# Patient Record
Sex: FEMALE | Race: BLACK OR AFRICAN AMERICAN | Employment: FULL TIME | URBAN - METROPOLITAN AREA
[De-identification: names, ages, dates, MRNs, and addresses within clinical notes are randomized per-mention and may not be internally consistent; named-entity substitution may affect disease eponyms.]

---

## 2020-06-25 ENCOUNTER — VIRTUAL VISIT (OUTPATIENT)
Dept: FAMILY MEDICINE CLINIC | Age: 50
End: 2020-06-25

## 2020-06-25 DIAGNOSIS — Z12.11 COLON CANCER SCREENING: ICD-10-CM

## 2020-06-25 DIAGNOSIS — Z12.39 BREAST CANCER SCREENING: ICD-10-CM

## 2020-06-25 DIAGNOSIS — Z76.89 ENCOUNTER TO ESTABLISH CARE: Primary | ICD-10-CM

## 2020-06-25 DIAGNOSIS — M25.512 ACUTE PAIN OF LEFT SHOULDER: ICD-10-CM

## 2020-06-25 RX ORDER — AMLODIPINE BESYLATE 10 MG/1
TABLET ORAL
COMMUNITY
Start: 2020-05-15 | End: 2020-11-20 | Stop reason: SDUPTHER

## 2020-06-25 RX ORDER — HYDROCHLOROTHIAZIDE 25 MG/1
TABLET ORAL
COMMUNITY
Start: 2020-05-15 | End: 2020-11-20 | Stop reason: SDUPTHER

## 2020-06-25 RX ORDER — POTASSIUM CHLORIDE 20 MEQ/1
TABLET, EXTENDED RELEASE ORAL
COMMUNITY
Start: 2020-05-26 | End: 2020-09-09 | Stop reason: SDUPTHER

## 2020-06-25 RX ORDER — METOPROLOL TARTRATE 25 MG/1
TABLET, FILM COATED ORAL
COMMUNITY
Start: 2020-05-26 | End: 2020-07-03 | Stop reason: SDUPTHER

## 2020-06-25 RX ORDER — CETIRIZINE HCL 10 MG
TABLET ORAL
COMMUNITY

## 2020-06-25 NOTE — PROGRESS NOTES
Kristin Blanco  48 y.o. female  1970  [address] [facility] [address]  [id number]   [address]:    Telemedicine Progress Note  Daria Parish MD       Encounter Date and Time: June 25, 2020 at 8:09 AM    Consent: Inder Troncoso, who was seen by synchronous (real-time) audio-video technology, and/or her healthcare decision maker, is aware that this patient-initiated, Telehealth encounter on 6/25/2020 is a billable service, with coverage as determined by her insurance carrier. She is aware that she may receive a bill and has provided verbal consent to proceed: Yes. Chief Complaint   Patient presents with    Establish Care     History of Present Illness   Inder Troncoso is a 48 y.o. female was evaluated by synchronous (real-time) audio-video technology from home, through a secure patient portal.      Patient has a history of HTN, ovarian cysts, menorrhagia s/p hysterectomy, and hypertensive retinopathy and presents to establish care and discuss shoulder pain. Reports pain in left shoulder x3 months. Describes as aching pain radiating down to the left elbow that worsens with lifting heavy objects at work (works at [employer]). Pain worse at night. Better with Oxycodone 5mg (had 3 tabs left over from hysterectomy in 2/2020). Tried Ibuprofen twice daily with relief. Aleve did not help. Occasionally hears popping sound from shoulder. OB/Gyn:  S/p hysterectomy in 2/2020  [id number]  Sexually active?: Yes  Diet: well-balanced; avoids pork  Exercise: none       Immunizations - reviewed: There is no immunization history on file for this patient. Tdap: unsure of last booster  Zostervax:  Today    Health Maintenance reviewed -  Pap smear-about one year ago; denies hx abnormal paps  Mammogram-abnormal results 6 months ago followed by benign ultrasound  Colonoscopy-Today   DEXA scan-Not indicated  HIV testing-Negative in adult life  Hepatitis C testing-Today  Lung cancer screening-Not indicated    Review of Systems   Review of Systems   Constitutional: Negative for chills and fever. HENT: Negative for congestion and sore throat. Eyes: Negative for blurred vision and double vision. Respiratory: Negative for cough and shortness of breath. Cardiovascular: Negative for chest pain and leg swelling. Gastrointestinal: Negative for nausea and vomiting. Genitourinary: Negative for dysuria and urgency. Musculoskeletal: Negative for back pain. Left shoulder pain   Skin: Negative for itching and rash. Neurological: Negative for focal weakness and headaches. Vitals/Objective:     General: alert, cooperative, no distress   Mental  status: mental status: alert, oriented to person, place, and time, normal mood, behavior, speech, dress, motor activity, and thought processes   Resp: resp: normal effort and no respiratory distress   MSK: Full ROM in shoulders and elbows bilaterally; although abduction of shoulder on left causes pain. Neuro: neuro: no gross deficits   Skin: skin: no discoloration or lesions of concern on visible areas   Due to this being a TeleHealth evaluation, many elements of the physical examination are unable to be assessed. Assessment and Plan:       Assessment/Plan:    Establish care:  -Colonoscopy ordered  -Mammogram-initially abnormal w/ benign ultrasound 6 months ago; next due in 6 months  -Zostervax ordered  -Will need RN visit for Tdap  -Hepatitis C  -CBC, BMP, lipid    Left shoulder pain: Likely OA; low suspicion for rotator cuff injury or impingement based on exam.  -Xray of left shoulder   -Ibuprofen Q4-6H PRN and Tylenol Q6H PRN    HTN: Stable.   Continue Norvasc 10mg daily, HCTZ 25mg daily, and Metoprolol 25mg BID        Time spent in direct conversation with the patient to include medical condition(s) discussed, assessment and treatment plan:       We discussed the expected course, resolution and complications of the diagnosis(es) in detail. Medication risks, benefits, costs, interactions, and alternatives were discussed as indicated. I advised her to contact the office if her condition worsens, changes or fails to improve as anticipated. She expressed understanding with the diagnosis(es) and plan. Patient understands that this encounter was a temporary measure, and the importance of further follow up and examination was emphasized. Patient verbalized understanding. Patient informed to follow up: PRN. Follow-up and Dispositions           Electronically Signed: Ivette Chacon MD, Family Medicine Resident    CPT Codes 02943-90982 for Established Patients may apply to this Telehealth Visit. POS code: 18. Modifier GT    Marilee Melgoza is a 48 y.o. female who was evaluated by an audio-video encounter for concerns as above. Patient identification was verified prior to start of the visit. A caregiver was present when appropriate. Due to this being a TeleHealth encounter (During [facility] public health emergency), evaluation of the following organ systems was limited: Vitals/Constitutional/EENT/Resp/CV/GI//MS/Neuro/Skin/Heme-Lymph-Imm. Pursuant to the emergency declaration under the [facility] waiver authority and the [employer] Act, this Virtual Visit was conducted, with patient's (and/or legal guardian's) consent, to reduce the patient's risk of exposure to COVID-19 and provide necessary medical care. Services were provided through a synchronous discussion virtually to substitute for in-person clinic visit. I was at home. The patient was at home. History   Patients past medical, surgical and family histories were reviewed and updated.       Past Medical History:   Diagnosis Date    Hypertension     Hypertensive retinopathy     Ovarian cyst      Past Surgical History:   Procedure Laterality Date    HX  SECTION      HX HYSTERECTOMY  2020    hystrectomy with unilateral oophorectomy    HX TUBAL LIGATION  1994     Family History   Problem Relation Age of Onset    Diabetes Father     Stroke Father      Social History     Socioeconomic History    Marital status: SINGLE     Spouse name: Not on file    Number of children: Not on file    Years of education: Not on file    Highest education level: Not on file   Occupational History    Not on file   Social Needs    Financial resource strain: Not on file    Food insecurity     Worry: Not on file     Inability: Not on file    Transportation needs     Medical: Not on file     Non-medical: Not on file   Tobacco Use    Smoking status: Never Smoker    Smokeless tobacco: Never Used   Substance and Sexual Activity    Alcohol use: Yes     Comment: social    Drug use: Never    Sexual activity: Not on file   Lifestyle    Physical activity     Days per week: Not on file     Minutes per session: Not on file    Stress: Not on file   Relationships    Social connections     Talks on phone: Not on file     Gets together: Not on file     Attends Jewish service: Not on file     Active member of club or organization: Not on file     Attends meetings of clubs or organizations: Not on file     Relationship status: Not on file    Intimate partner violence     Fear of current or ex partner: Not on file     Emotionally abused: Not on file     Physically abused: Not on file     Forced sexual activity: Not on file   Other Topics Concern    Not on file   Social History Narrative    Not on file     There is no problem list on file for this patient.          Current Medications/Allergies   Medications and Allergies reviewed:      Not on File

## 2020-06-30 ENCOUNTER — HOSPITAL ENCOUNTER (OUTPATIENT)
Dept: LAB | Age: 50
Discharge: HOME | End: 2020-06-30

## 2020-06-30 DIAGNOSIS — Z76.89 ENCOUNTER TO ESTABLISH CARE: ICD-10-CM

## 2020-06-30 LAB
ANION GAP SERPL CALC-SCNC: 7 MMOL/L (ref 5–15)
BUN SERPL-MCNC: 8 MG/DL (ref 6–20)
BUN/CREAT SERPL: 13 (ref 12–20)
CALCIUM SERPL-MCNC: 8.8 MG/DL (ref 8.5–10.1)
CHLORIDE SERPL-SCNC: 103 MMOL/L (ref 97–108)
CHOLEST SERPL-MCNC: 177 MG/DL
CO2 SERPL-SCNC: 25 MMOL/L (ref 21–32)
CREAT SERPL-MCNC: 0.62 MG/DL (ref 0.55–1.02)
ERYTHROCYTE [DISTWIDTH] IN BLOOD BY AUTOMATED COUNT: 14.8 % (ref 11.5–14.5)
GLUCOSE SERPL-MCNC: 83 MG/DL (ref 65–100)
HCT VFR BLD AUTO: 40.1 % (ref 35–47)
HCV AB SERPL QL IA: NONREACTIVE
HCV COMMENT,HCGAC: NORMAL
HDLC SERPL-MCNC: 40 MG/DL
HDLC SERPL: 4.4 {RATIO} (ref 0–5)
HGB BLD-MCNC: 12.9 G/DL (ref 11.5–16)
LDLC SERPL CALC-MCNC: 109.4 MG/DL (ref 0–100)
LIPID PROFILE,FLP: ABNORMAL
MCH RBC QN AUTO: 32.3 PG (ref 26–34)
MCHC RBC AUTO-ENTMCNC: 32.2 G/DL (ref 30–36.5)
MCV RBC AUTO: 100.5 FL (ref 80–99)
NRBC # BLD: 0 K/UL (ref 0–0.01)
NRBC BLD-RTO: 0 PER 100 WBC
PLATELET # BLD AUTO: 213 K/UL (ref 150–400)
PMV BLD AUTO: 13 FL (ref 8.9–12.9)
POTASSIUM SERPL-SCNC: 3.8 MMOL/L (ref 3.5–5.1)
RBC # BLD AUTO: 3.99 M/UL (ref 3.8–5.2)
SODIUM SERPL-SCNC: 135 MMOL/L (ref 136–145)
TRIGL SERPL-MCNC: 138 MG/DL (ref ?–150)
VLDLC SERPL CALC-MCNC: 27.6 MG/DL
WBC # BLD AUTO: 6.7 K/UL (ref 3.6–11)

## 2020-07-02 ENCOUNTER — TELEPHONE (OUTPATIENT)
Dept: FAMILY MEDICINE CLINIC | Age: 50
End: 2020-07-02

## 2020-07-02 NOTE — TELEPHONE ENCOUNTER
----- Message from Isaias Lee sent at 7/2/2020 11:34 AM EDT -----  Regarding: Leak/telephone  Pt is requesting her x ray results on her left shoulder. Pts number is [phone].

## 2020-07-03 ENCOUNTER — TELEPHONE (OUTPATIENT)
Dept: OBGYN CLINIC | Age: 50
End: 2020-07-03

## 2020-07-03 DIAGNOSIS — M25.512 ACUTE PAIN OF LEFT SHOULDER: Primary | ICD-10-CM

## 2020-07-03 DIAGNOSIS — D75.89 MACROCYTOSIS: ICD-10-CM

## 2020-07-03 RX ORDER — METOPROLOL TARTRATE 25 MG/1
25 TABLET, FILM COATED ORAL 2 TIMES DAILY
Qty: 60 TAB | Refills: 2 | Status: SHIPPED | OUTPATIENT
Start: 2020-07-03 | End: 2020-10-01

## 2020-07-03 NOTE — TELEPHONE ENCOUNTER
Discussed lab results with patient. Referral to Sports Med and PT placed for possible left [place] lesion. Patient has macrocytosis--checking B12, folate. Patient understands and agrees with plan.

## 2020-07-07 ENCOUNTER — OFFICE VISIT (OUTPATIENT)
Dept: FAMILY MEDICINE CLINIC | Age: 50
End: 2020-07-07

## 2020-07-07 ENCOUNTER — HOSPITAL ENCOUNTER (OUTPATIENT)
Dept: LAB | Age: 50
Discharge: HOME | End: 2020-07-07

## 2020-07-07 VITALS
WEIGHT: 195 LBS | HEIGHT: 66 IN | RESPIRATION RATE: 20 BRPM | TEMPERATURE: 98.4 F | HEART RATE: 79 BPM | BODY MASS INDEX: 31.34 KG/M2 | DIASTOLIC BLOOD PRESSURE: 77 MMHG | OXYGEN SATURATION: 97 % | SYSTOLIC BLOOD PRESSURE: 120 MMHG

## 2020-07-07 DIAGNOSIS — D75.89 MACROCYTOSIS: ICD-10-CM

## 2020-07-07 DIAGNOSIS — M75.102 ROTATOR CUFF SYNDROME OF LEFT SHOULDER: Primary | ICD-10-CM

## 2020-07-07 LAB
FOLATE SERPL-MCNC: 17.1 NG/ML (ref 5–21)
VIT B12 SERPL-MCNC: 345 PG/ML (ref 193–986)

## 2020-07-07 RX ORDER — TRIAMCINOLONE ACETONIDE 40 MG/ML
40 INJECTION, SUSPENSION INTRA-ARTICULAR; INTRAMUSCULAR ONCE
Qty: 1 ML | Refills: 0
Start: 2020-07-07 | End: 2020-07-07

## 2020-07-07 RX ORDER — LIDOCAINE HYDROCHLORIDE 10 MG/ML
3 INJECTION INFILTRATION; PERINEURAL ONCE
Qty: 3 ML | Refills: 0
Start: 2020-07-07 | End: 2020-07-07

## 2020-07-07 NOTE — PROGRESS NOTES
History of Present Illness     Chief Complaint   Patient presents with    Shoulder Pain     left shoulder, pain is 4/10 - 2-3 months - patient believes she may have hurt it at work ([address])       Patient Identification  Zulema Taylor is a 48 y.o. female complains of pain in the left shoulder pain. Date of Onset: 2-3 months   Mechanism of Injury: No MIRNA   Alleviating Factors: oxycodone  Aggravating Factors:putting on bra, reaching for thing    Imaging: I personally reviewed xray    Past Medical History:   Diagnosis Date    Hypertension     Hypertensive retinopathy     Ovarian cyst      Family History   Problem Relation Age of Onset    Diabetes Father     Stroke Father      Current Outpatient Medications   Medication Sig Dispense Refill    lidocaine (XYLOCAINE) 10 mg/mL (1 %) injection 3 mL by IntraBURSal route once for 1 dose. 3 mL 0    triamcinolone acetonide (Kenalog) 40 mg/mL injection 1 mL by IntraBURSal route once for 1 dose. 1 mL 0    metoprolol tartrate (LOPRESSOR) 25 mg tablet Take 1 Tab by mouth two (2) times a day for 90 days. 60 Tab 2    potassium chloride (K-DUR, KLOR-CON) 20 mEq tablet TAKE 1 TABLET BY MOUTH ONCE DAILY      amLODIPine (NORVASC) 10 mg tablet TAKE 1 TABLET BY MOUTH ONCE DAILY      cetirizine (ZyrTEC) 10 mg tablet Zyrtec 10 mg tablet   Take 1 tablet every day by oral route at bedtime for 90 days.  hydroCHLOROthiazide (HYDRODIURIL) 25 mg tablet TAKE 1 TABLET BY MOUTH ONCE DAILY       No Known Allergies    Review of Systems  A comprehensive review of systems was negative except for that written in the HPI. Physical Exam     Visit Vitals  /77 (BP 1 Location: Right arm, BP Patient Position: Sitting)   Pulse 79   Temp 98.4 °F (36.9 °C) (Temporal)   Resp 20   Ht 5' 6\" (1.676 m)   Wt 195 lb (88.5 kg)   SpO2 97%   BMI 31.47 kg/m²       GEN: Well appearing. No apparent distress. Responds to all questions appropriately. Lungs: No labored respirations.  Talking in  complete sentences without difficulty. Shoulder: Right    Deformity: None    ROM:  Forward Flexion: Active: 180 Passive: 180  ER at 90 degrees abduction: Active: 90 Passive:90  IR at 90 degrees abduction: Active: 90 Passive:90  Abduction: Active: 180 Passive: 180    Scapular Motion: Mild dyskinesia noted    Palpation:  AC tenderness: None  SC tenderness: None  Clavicle tenderness: None  Biceps tenderness: None    Strength:  Empty Can(Supraspinatus): Left:5/5 Right:5/5  External rotation(Infraspinatus): Left:5/5 Right: 5/5  Lift off(Subscapularis): Left:5/5 Right: 5/5    Rotator Cuff Exam:  Neers sign: Positive  [place] sign: Positive  Painful Arc: Negative  Lift-off sign / Belly Press: Negative  Biceps/Labrum/AC Exam:  Yergasons Test: Negative  Speeds Test: Negative  OLashas Sign: Negative  Cross-chest adduction: Negative  Scarf Test: Negative    Neuro/Vascular:  Pulses intact, no edema, and neurologically intact  Skin: No obvious rash or skin breakdown       [facility]  OFFICE PROCEDURE PROGRESS NOTE        Chart reviewed for the following:   Claudio Read MD, have reviewed the History, Physical and updated the Allergic reactions for Kristin Bella     TIME OUT performed immediately prior to start of procedure:   I, Terence Joseph MD, have performed the following reviews on Kristin Bella prior to the start of the procedure:            * Patient was identified by name and date of birth   * Agreement on procedure being performed was verified  * Risks and Benefits explained to the patient  * Procedure site verified and marked as necessary  * Patient was positioned for comfort  * Consent was signed and verified     Time: 3:45pm      Date of procedure: 7/7/2020    Procedure performed by:  Terence Joseph MD    Provider assisted by:  Farhad Gale LPN    Patient assisted by: self    How tolerated by patient: tolerated the procedure well with no complications    Post Procedural Pain Scale: 0 - No Hurt      Indications:   Diagnostic    Procedure:  After verbal consent was obtained, risks and benefits of the procedure were discussed with the patient and alternatives discussed. Time out performed, cross checking patient ID and procedure. Confirmed that the patient does not have history of prior adverse reactions, active infections, or relevant allergies. There was no effusion, erythema, or warmth, and the skin was clear. The skin was cleaned using chlorohexadine x 2. Topical anesthesia was achieved with ethyl chloride. A 25 gauge needle was inserted into the right subacromial space via a lateral approach. The site was injected with a mixture of 40mg of Kenalog and 3ml 1% Lidocaine. The injection was completed without complication, and a bandage was applied. Patient felt 40% better after injection. No results found for any visits on 07/07/20. Assessment:    ICD-10-CM ICD-9-CM    1. Rotator cuff syndrome of left shoulder M75.102 726.10 TRIAMCINOLONE ACETONIDE INJ      lidocaine (XYLOCAINE) 10 mg/mL (1 %) injection      MA DRAIN/INJECT LARGE JOINT/BURSA      triamcinolone acetonide (Kenalog) 40 mg/mL injection      REFERRAL TO PHYSICAL THERAPY       Plan:  -Improved with injection.  -Home Exercise Program: As discussed in clinic and per handout.  -Start Physical Therapy   -Reassess in 6 weeks. Consider intra-articular etiology of her pain if pain is persistent at follow-up    After Care Instructions: The patient is asked to continue to rest the joint for a few more days before resuming regular activities. It may be more painful for the first 1-2 days. Watch for fever, or increased swelling or persistent pain in the joint. Call or return to clinic prn if such symptoms occur or there is failure to improve as anticipated. Follow-up and Dispositions    · Return in about 6 weeks (around 8/18/2020) for Shoulder pain.

## 2020-07-07 NOTE — PATIENT INSTRUCTIONS
Rotator Cuff Problems: Care Instructions Your Care Instructions The rotator cuff is a group of tendons and muscles around the shoulder that keeps the shoulder joint stable and allows you to raise and rotate your arm. Over time, daily wear and exercise can cause the tendons to rub on the bones of your shoulder. This is called impingement. This condition may cause the tendons to bruise, degenerate, or tear. In many people, these problems do not cause pain. When they do cause pain, you can use rest, physical therapy, ice and heat, and anti-inflammatory medicine to reduce pain and swelling. If you still have pain after trying these treatments, you and your doctor can discuss having a steroid injection or surgery. Follow-up care is a key part of your treatment and safety. Be sure to make and go to all appointments, and call your doctor if you are having problems. It's also a good idea to know your test results and keep a list of the medicines you take. How can you care for yourself at home? · Be safe with medicines. Read and follow all instructions on the label. ? If the doctor gave you a prescription medicine for pain, take it as prescribed. ? If you are not taking a prescription pain medicine, ask your doctor if you can take an over-the-counter medicine. · Put ice or a cold pack on your shoulder for 10 to 20 minutes at a time. Try to do this every 1 to 2 hours for the next 3 days (when you are awake). Put a thin cloth between the ice pack and your skin. · After 3 days, put a warm, wet towel on your shoulder. This is to relax the muscles and increase blood flow. While holding the towel on your shoulder, lean forward so your arm hangs freely, and gently swing your arm back and forth like a pendulum. You also can do this standing under a warm shower. · Follow your doctor's advice for physical therapy. When your doctor says it is okay, try these stretching exercises. Do them slowly to avoid injury. Put a warm, wet towel on your shoulder before exercising. Stop any exercise that increases pain. ? Range-of-motion exercises. If it is not too painful, stretch your arm in four directions: across the body, up the back, to the side, and overhead. ? Pendulum exercise. Lean forward and hold onto a table or the back of a chair with your good arm. Bend at the waist, letting the arm with the sore shoulder hang straight down. Swing your arm back and forth like a pendulum, then in circles that start small and slowly grow larger. This exercise does not use the arm muscles. Instead, use your legs and your hips to create movement that makes your arm swing freely. Try this for about 5 minutes, several times a day. ? Wall climbing (to the side). Stand with your side to a wall so that your fingers can just touch it. Then turn so your body is turned slightly toward the wall. Walk the fingers of your injured arm up the wall as high as pain permits. Try not to shrug your shoulder up toward your ear as you move your arm up. Hold that position for a count of 15 to 30 seconds. Walk your fingers down to the starting position. Repeat 2 to 4 times, trying to reach higher each time. ? Wall climbing (to the front). Face a wall, standing so your fingers can just touch it. Walk the fingers of your affected arm up the wall as high as pain permits. Try not to shrug your shoulder up toward your ear as you move your arm up. Hold that position for a count of 15 to 30 seconds. Slowly walk your fingers to the starting position. Repeat 2 to 4 times, trying to reach higher each time. · Rest your shoulder when you are not doing stretches and other exercises. Your doctor may tell you to wait for the pain to go away before doing exercises. Do not lift heavy bags of groceries, play sports, or do anything else that makes you twist or stress your shoulder. Avoid activities where you move your affected arm above your head. When should you call for help? Call your doctor now or seek immediate medical care if: 
· You have severe pain. · You cannot move your shoulder or arm. · You have tingling or numbness in your arm or hand. · Your arm or hand is cool or pale. Watch closely for changes in your health, and be sure to contact your doctor if: 
· Your pain gets worse. · You have new or worse swelling in your arm or hand. · You do not get better as expected. Where can you learn more? Go to http://www.gray.com/ Enter E207 in the search box to learn more about \"Rotator Cuff Problems: Care Instructions. \" Current as of: March 2, 2020               Content Version: 12.5 © 8870-4918 [employer]. Care instructions adapted under license by [employer] (which disclaims liability or warranty for this information). If you have questions about a medical condition or this instruction, always ask your healthcare professional. [address] any warranty or liability for your use of this information.

## 2020-07-07 NOTE — PROGRESS NOTES
Identified Patient with two Patient identifiers (Name and ). Two Patient Identifiers confirmed. Reviewed record in preparation for visit and have obtained necessary documentation. Chief Complaint   Patient presents with    Shoulder Pain     left shoulder, pain is 4/10 - 2-3 months - patient believes she may have hurt it at work ([employer])       Visit Vitals  /77 (BP 1 Location: Right arm, BP Patient Position: Sitting)   Pulse 79   Temp 98.4 °F (36.9 °C) (Temporal)   Resp 20   Ht 5' 6\" (1.676 m)   Wt 195 lb (88.5 kg)   SpO2 97%   BMI 31.47 kg/m²       1. Have you been to the ER, urgent care clinic since your last visit? Hospitalized since your last visit? No    2. Have you seen or consulted any other health care providers outside of the [address] [place] [address] since your last visit? Include any pap smears or colon screening.  No

## 2020-09-09 NOTE — TELEPHONE ENCOUNTER
----- Message from Herrick Dinesh sent at 9/8/2020  4:46 PM EDT -----  Regarding: DR Lorena Freitas /  REFILL  General Message/Vendor Calls    Pt is requesting to speak with nurse in regard to getting a refill on Potassium,  please call into the [facility] IV[facility] LOCO listed in chart.        Callback required       Best contact number(s):  [phone]                [employer]

## 2020-09-10 RX ORDER — POTASSIUM CHLORIDE 20 MEQ/1
TABLET, EXTENDED RELEASE ORAL
Qty: 60 TAB | Refills: 1 | Status: SHIPPED | OUTPATIENT
Start: 2020-09-10

## 2020-11-19 ENCOUNTER — VIRTUAL VISIT (OUTPATIENT)
Dept: FAMILY MEDICINE CLINIC | Age: 50
End: 2020-11-19

## 2020-11-19 DIAGNOSIS — R42 POSTURAL DIZZINESS WITH PRESYNCOPE: Primary | ICD-10-CM

## 2020-11-19 DIAGNOSIS — R55 POSTURAL DIZZINESS WITH PRESYNCOPE: Primary | ICD-10-CM

## 2020-11-19 RX ORDER — METOPROLOL TARTRATE 25 MG/1
25 TABLET, FILM COATED ORAL 2 TIMES DAILY
COMMUNITY
End: 2021-07-09

## 2020-11-19 NOTE — PROGRESS NOTES
Kristin Blanco  48 y.o. female  1970  [address]  [id number]   [address]:    Telemedicine Progress Note  Alina Urias MD       Encounter Date and Time: November 19, 2020 at 11:03 AM    Consent:  She and/or the health care decision maker is aware that that she may receive a bill for this telephone service, depending on her insurance coverage, and has provided verbal consent to proceed: Yes    Chief Complaint   Patient presents with    Medication Refill    Labs     History of Present Illness   Kulwant Beatty is a 48 y.o. female was evaluated by synchronous (real-time) audio-video technology from home, through the [facility].    PreSyncope  Believes it may be anxiety. She's okay one minute then feel like she's about to pass out. Sometimes feels like her heart rate increases beforehand. Thinks it may be a panic attack or associated with low blood sugar. Has not noticed whether its associated with not eating. Last time it happened she took a couple of deep breaths and it stopped. This has been going on since 2010. In the last 3-6 months has not passed out. Hysterectomy 2/2020 for heavy menses now without any menstrual. No history of anxiety nor depression. Has never been in therapy. She does take her blood pressure at home when she has a HA or feels like her heart is racing - runs 110-120s/80-90. Has never seen cardiology. No family history of sudden death at young age. Father had a stroke in his early 76s but no other family history of MI. Father also had DM. Takes Norvasc, HCTZ and metoprolol for HTN however out of metoprolol x 2 days. Review of Systems   Review of Systems   Constitutional: Negative for chills and fever. Eyes: Negative for blurred vision and double vision. Respiratory: Negative for cough and shortness of breath. Cardiovascular: Negative for chest pain and palpitations.    Musculoskeletal: Negative for falls and myalgias. Neurological: Positive for dizziness. Negative for tingling, tremors, sensory change and headaches. Psychiatric/Behavioral: The patient is not nervous/anxious and does not have insomnia. Vitals/Objective:     General: alert, cooperative, no distress, moderately obese   Mental  status: mental status: alert, oriented to person, place, and time, normal mood, behavior, speech, dress, motor activity, and thought processes, affect appropriate to mood   Resp: resp: normal effort and no respiratory distress   Neuro: neuro: no gross deficits   Skin: skin: no discoloration or lesions of concern on visible areas   Due to this being a TeleHealth evaluation, many elements of the physical examination are unable to be assessed. Assessment and Plan:     54yo F with hx of HTN, Obesity presenting for intermittent episodes of lightheadedness. She had syncopal events prior to her hysterectomy for AUB 2/2020 which were likely due to anemia. HgB on CBC 6/2020 WNL. Other than HTN and Obesity she has no other cardiac hx however since these episodes seem unrelated to food intake and posture want to see in person for thorough CV exam, EKG and orthostatic VS; low threshold to refer to cardiology and will repeat labs. Advised patient to refrain from taking metoprolol for now until she is seen in person as BB may be contributing to low HR. Appt made for 11/20/2020 @ 2:15pm     1. Postural dizziness with presyncope  - CBC W/O DIFF; Future  - METABOLIC PANEL, COMPREHENSIVE; Future  - HEMOGLOBIN A1C WITH EAG; Future      Time spent in direct conversation with the patient to include medical condition(s) discussed, assessment and treatment plan:       We discussed the expected course, resolution and complications of the diagnosis(es) in detail. Medication risks, benefits, costs, interactions, and alternatives were discussed as indicated.   I advised her to contact the office if her condition worsens, changes or fails to improve as anticipated. She expressed understanding with the diagnosis(es) and plan. Patient understands that this encounter was a temporary measure, and the importance of further follow up and examination was emphasized. Patient verbalized understanding. Patient informed to follow up: Follow-up and Dispositions  ·   Return in about 1 year (around 2021) for in offices visit for further evaluation. Electronically Signed: Ezra Vu MD    Providers location when delivering service: home    CPT Codes 91864-59001 for Established Patients may apply to this Telehealth Visit. POS code: 18. Modifier GT      Pursuant to the emergency declaration under the [address] [place] [address] waiver authority and the [employer] Act, this Virtual  Visit was conducted, with patient's consent, to reduce the patient's risk of exposure to COVID-19 and provide continuity of care for an established patient. Services were provided through a video synchronous discussion virtually to substitute for in-person clinic visit. History   Patients past medical, surgical and family histories were reviewed and updated.       Past Medical History:   Diagnosis Date    Hypertension     Hypertensive retinopathy     Ovarian cyst      Past Surgical History:   Procedure Laterality Date    HX  SECTION      HX HYSTERECTOMY      hystrectomy with unilateral oophorectomy    HX TUBAL LIGATION       Family History   Problem Relation Age of Onset    Diabetes Father     Stroke Father      Social History     Socioeconomic History    Marital status: SINGLE     Spouse name: Not on file    Number of children: Not on file    Years of education: Not on file    Highest education level: Not on file   Occupational History    Not on file   Social Needs    Financial resource strain: Not on file    Food insecurity     Worry: Not on file Inability: Not on file    Transportation needs     Medical: Not on file     Non-medical: Not on file   Tobacco Use    Smoking status: Never Smoker    Smokeless tobacco: Never Used   Substance and Sexual Activity    Alcohol use: Yes     Comment: social    Drug use: Never    Sexual activity: Not on file   Lifestyle    Physical activity     Days per week: Not on file     Minutes per session: Not on file    Stress: Not on file   Relationships    Social connections     Talks on phone: Not on file     Gets together: Not on file     Attends Mormonism service: Not on file     Active member of club or organization: Not on file     Attends meetings of clubs or organizations: Not on file     Relationship status: Not on file    Intimate partner violence     Fear of current or ex partner: Not on file     Emotionally abused: Not on file     Physically abused: Not on file     Forced sexual activity: Not on file   Other Topics Concern    Not on file   Social History Narrative    Not on file     There is no problem list on file for this patient. Current Medications/Allergies   Medications and Allergies reviewed:    Current Outpatient Medications   Medication Sig Dispense Refill    metoprolol tartrate (LOPRESSOR) 25 mg tablet Take 25 mg by mouth two (2) times a day.  potassium chloride (K-DUR, KLOR-CON) 20 mEq tablet TAKE 1 TABLET BY MOUTH ONCE DAILY 60 Tab 1    amLODIPine (NORVASC) 10 mg tablet TAKE 1 TABLET BY MOUTH ONCE DAILY      cetirizine (ZyrTEC) 10 mg tablet Zyrtec 10 mg tablet   Take 1 tablet every day by oral route at bedtime for 90 days.       hydroCHLOROthiazide (HYDRODIURIL) 25 mg tablet TAKE 1 TABLET BY MOUTH ONCE DAILY       No Known Allergies

## 2020-11-20 ENCOUNTER — OFFICE VISIT (OUTPATIENT)
Dept: FAMILY MEDICINE CLINIC | Age: 50
End: 2020-11-20
Payer: COMMERCIAL

## 2020-11-20 VITALS
BODY MASS INDEX: 32.02 KG/M2 | DIASTOLIC BLOOD PRESSURE: 79 MMHG | HEART RATE: 85 BPM | OXYGEN SATURATION: 97 % | RESPIRATION RATE: 16 BRPM | TEMPERATURE: 97.5 F | SYSTOLIC BLOOD PRESSURE: 121 MMHG | WEIGHT: 199.2 LBS | HEIGHT: 66 IN

## 2020-11-20 DIAGNOSIS — I10 ESSENTIAL HYPERTENSION: ICD-10-CM

## 2020-11-20 DIAGNOSIS — R42 POSTURAL DIZZINESS WITH PRESYNCOPE: Primary | ICD-10-CM

## 2020-11-20 DIAGNOSIS — R55 POSTURAL DIZZINESS WITH PRESYNCOPE: Primary | ICD-10-CM

## 2020-11-20 PROCEDURE — 99214 OFFICE O/P EST MOD 30 MIN: CPT | Performed by: STUDENT IN AN ORGANIZED HEALTH CARE EDUCATION/TRAINING PROGRAM

## 2020-11-20 RX ORDER — AMLODIPINE BESYLATE 10 MG/1
TABLET ORAL
Qty: 90 TAB | Refills: 1 | Status: SHIPPED | OUTPATIENT
Start: 2020-11-20 | End: 2021-07-09 | Stop reason: SDUPTHER

## 2020-11-20 RX ORDER — HYDROCHLOROTHIAZIDE 25 MG/1
25 TABLET ORAL DAILY
Qty: 90 TAB | Refills: 1 | Status: SHIPPED | OUTPATIENT
Start: 2020-11-20 | End: 2021-07-09 | Stop reason: SDUPTHER

## 2020-11-20 NOTE — PATIENT INSTRUCTIONS
Dizziness: Care Instructions Your Care Instructions Dizziness is the feeling of unsteadiness or fuzziness in your head. It is different than having vertigo, which is a feeling that the room is spinning or that you are moving or falling. It is also different from lightheadedness, which is the feeling that you are about to faint. It can be hard to know what causes dizziness. Some people feel dizzy when they have migraine headaches. Sometimes bouts of flu can make you feel dizzy. Some medical conditions, such as heart problems or high blood pressure, can make you feel dizzy. Many medicines can cause dizziness, including medicines for high blood pressure, pain, or anxiety. If a medicine causes your symptoms, your doctor may recommend that you stop or change the medicine. If it is a problem with your heart, you may need medicine to help your heart work better. If there is no clear reason for your symptoms, your doctor may suggest watching and waiting for a while to see if the dizziness goes away on its own. Follow-up care is a key part of your treatment and safety. Be sure to make and go to all appointments, and call your doctor if you are having problems. It's also a good idea to know your test results and keep a list of the medicines you take. How can you care for yourself at home? · If your doctor recommends or prescribes medicine, take it exactly as directed. Call your doctor if you think you are having a problem with your medicine. · Do not drive while you feel dizzy. · Try to prevent falls. Steps you can take include: ? Using nonskid mats, adding grab bars near the tub, and using night-lights. ? Clearing your home so that walkways are free of anything you might trip on. 
? Letting family and friends know that you have been feeling dizzy. This will help them know how to help you. When should you call for help? Call 911 anytime you think you may need emergency care. For example, call if:   · You passed out (lost consciousness).  
  · You have dizziness along with symptoms of a heart attack. These may include: 
? Chest pain or pressure, or a strange feeling in the chest. 
? Sweating. ? Shortness of breath. ? Nausea or vomiting. ? Pain, pressure, or a strange feeling in the back, neck, jaw, or upper belly or in one or both shoulders or arms. ? Lightheadedness or sudden weakness. ? A fast or irregular heartbeat.  
  · You have symptoms of a stroke. These may include: 
? Sudden numbness, tingling, weakness, or loss of movement in your face, arm, or leg, especially on only one side of your body. ? Sudden vision changes. ? Sudden trouble speaking. ? Sudden confusion or trouble understanding simple statements. ? Sudden problems with walking or balance. ? A sudden, severe headache that is different from past headaches. Call your doctor now or seek immediate medical care if: 
  · You feel dizzy and have a fever, headache, or ringing in your ears.  
  · You have new or increased nausea and vomiting.  
  · Your dizziness does not go away or comes back. Watch closely for changes in your health, and be sure to contact your doctor if: 
  · You do not get better as expected. Where can you learn more? Go to http://www.gray.com/ Enter E407 in the search box to learn more about \"Dizziness: Care Instructions. \" Current as of: June 26, 2019               Content Version: 12.6 © 1543-1373 [employer]. Care instructions adapted under license by [employer] (which disclaims liability or warranty for this information). If you have questions about a medical condition or this instruction, always ask your healthcare professional. [place] [address] any warranty or liability for your use of this information.

## 2020-11-20 NOTE — PROGRESS NOTES
David Palmer is a 48 y.o. female    Chief Complaint   Patient presents with    Cardiac Testing     Patient is cming in for blood work. She states doctr wants ECG and orthastatic blood pressures. No other concerns. 1. Have you been to the ER, urgent care clinic since your last visit? Hospitalized since your last visit? No  M  2. Have you seen or consulted any other health care providers outside of the [address] [place] [address] since your last visit? Include any pap smears or colon screening. No      Visit Vitals  /79 (BP 1 Location: Left arm, BP Patient Position: Standing)   Pulse 85   Temp 97.5 °F (36.4 °C) (Temporal)   Resp 16   Ht 5' 6\" (1.676 m)   Wt 199 lb 3.2 oz (90.4 kg)   SpO2 97%   BMI 32.15 kg/m²           Health Maintenance Due   Topic Date Due    DTaP/Tdap/Td series (1 - Tdap) 06/25/1991    PAP AKA CERVICAL CYTOLOGY  06/25/1991    Shingrix Vaccine Age 50> (1 of 2) 06/25/2020    Colorectal Cancer Screening Combo  06/25/2020    Breast Cancer Screen Mammogram  06/25/2020    Flu Vaccine (1) 09/01/2020         Medication Reconciliation completed, changes noted.   Please  Update medication list.

## 2020-11-20 NOTE — PROGRESS NOTES
[address] Medicine Residency Attending Addendum:  Dr. Jennie Bennett MD,  the patient and I were not physically present during this encounter. The resident and I are concurrently monitoring the patient care through appropriate telecommunication technology. I discussed the findings, assessment and plan with the resident and agree with the resident's findings and plan as documented in the resident's note.       Suleiman Cr MD

## 2020-11-21 LAB
ALBUMIN SERPL-MCNC: 3.9 G/DL (ref 3.5–5)
ALBUMIN/GLOB SERPL: 1.1 {RATIO} (ref 1.1–2.2)
ALP SERPL-CCNC: 56 U/L (ref 45–117)
ALT SERPL-CCNC: 21 U/L (ref 12–78)
ANION GAP SERPL CALC-SCNC: 8 MMOL/L (ref 5–15)
AST SERPL-CCNC: 19 U/L (ref 15–37)
BILIRUB SERPL-MCNC: 0.4 MG/DL (ref 0.2–1)
BUN SERPL-MCNC: 7 MG/DL (ref 6–20)
BUN/CREAT SERPL: 11 (ref 12–20)
CALCIUM SERPL-MCNC: 9.3 MG/DL (ref 8.5–10.1)
CHLORIDE SERPL-SCNC: 103 MMOL/L (ref 97–108)
CO2 SERPL-SCNC: 26 MMOL/L (ref 21–32)
CREAT SERPL-MCNC: 0.65 MG/DL (ref 0.55–1.02)
ERYTHROCYTE [DISTWIDTH] IN BLOOD BY AUTOMATED COUNT: 14.1 % (ref 11.5–14.5)
EST. AVERAGE GLUCOSE BLD GHB EST-MCNC: 103 MG/DL
GLOBULIN SER CALC-MCNC: 3.6 G/DL (ref 2–4)
GLUCOSE SERPL-MCNC: 92 MG/DL (ref 65–100)
HBA1C MFR BLD: 5.2 % (ref 4–5.6)
HCT VFR BLD AUTO: 40.2 % (ref 35–47)
HGB BLD-MCNC: 12.9 G/DL (ref 11.5–16)
MCH RBC QN AUTO: 33.3 PG (ref 26–34)
MCHC RBC AUTO-ENTMCNC: 32.1 G/DL (ref 30–36.5)
MCV RBC AUTO: 103.9 FL (ref 80–99)
NRBC # BLD: 0 K/UL (ref 0–0.01)
NRBC BLD-RTO: 0 PER 100 WBC
PLATELET # BLD AUTO: 215 K/UL (ref 150–400)
PMV BLD AUTO: 12 FL (ref 8.9–12.9)
POTASSIUM SERPL-SCNC: 3.7 MMOL/L (ref 3.5–5.1)
PROT SERPL-MCNC: 7.5 G/DL (ref 6.4–8.2)
RBC # BLD AUTO: 3.87 M/UL (ref 3.8–5.2)
SODIUM SERPL-SCNC: 137 MMOL/L (ref 136–145)
TSH SERPL DL<=0.05 MIU/L-ACNC: 1.74 UIU/ML (ref 0.36–3.74)
WBC # BLD AUTO: 6.8 K/UL (ref 3.6–11)

## 2021-02-08 ENCOUNTER — HOSPITAL ENCOUNTER (EMERGENCY)
Age: 51
Discharge: HOME | End: 2021-02-08
Attending: EMERGENCY MEDICINE
Payer: COMMERCIAL

## 2021-02-08 VITALS
DIASTOLIC BLOOD PRESSURE: 89 MMHG | SYSTOLIC BLOOD PRESSURE: 130 MMHG | TEMPERATURE: 97.9 F | RESPIRATION RATE: 18 BRPM | HEART RATE: 70 BPM | OXYGEN SATURATION: 100 %

## 2021-02-08 DIAGNOSIS — M25.50 ARTHRALGIA, UNSPECIFIED JOINT: Primary | ICD-10-CM

## 2021-02-08 PROCEDURE — 99282 EMERGENCY DEPT VISIT SF MDM: CPT

## 2021-02-08 RX ORDER — KETOROLAC TROMETHAMINE 10 MG/1
10 TABLET, FILM COATED ORAL
Qty: 18 TAB | Refills: 0 | Status: SHIPPED | OUTPATIENT
Start: 2021-02-08 | End: 2021-02-15 | Stop reason: ALTCHOICE

## 2021-02-08 NOTE — ED TRIAGE NOTES
She says bilateral shoulder pain for some time worse with movement. She says she had an injection of her left shoulder but that has worn off. She works for [employer] and says she just cant stand the pain any longer.

## 2021-02-08 NOTE — DISCHARGE INSTRUCTIONS
You will need to take the medications as directed and follow up with Dr. Tatiana Zamora in the next week for more definitive care.

## 2021-02-08 NOTE — ED PROVIDER NOTES
This is a 63-year-old female who says that she started about 2 weeks ago with pain in her right shoulder and pain in the left thumb. 1 week prior to that, she developed pain in the left shoulder. She had had prior injection of the left shoulder by Dr. Marialuisa Caal at [facility]. She is scheduled to be seen again on the  of this month for the same problem by him. She works at [employer] and states that using her arms as she does, they have become inflamed and irritated. She is looking for something for discomfort until she gets into see Dr. Marialuisa Caal.  There is been no fever or chill, nausea or vomiting, shortness of breath or chest pain. Patient denies any diarrhea or  symptoms. She voices no other complaint.            Past Medical History:   Diagnosis Date    Hypertension     Hypertensive retinopathy     Ovarian cyst        Past Surgical History:   Procedure Laterality Date    HX  SECTION      HX HYSTERECTOMY  2020    hystrectomy with unilateral oophorectomy    HX TUBAL LIGATION           Family History:   Problem Relation Age of Onset    Diabetes Father     Stroke Father        Social History     Socioeconomic History    Marital status: SINGLE     Spouse name: Not on file    Number of children: Not on file    Years of education: Not on file    Highest education level: Not on file   Occupational History    Not on file   Social Needs    Financial resource strain: Not on file    Food insecurity     Worry: Not on file     Inability: Not on file    Transportation needs     Medical: Not on file     Non-medical: Not on file   Tobacco Use    Smoking status: Never Smoker    Smokeless tobacco: Never Used   Substance and Sexual Activity    Alcohol use: Yes     Comment: social    Drug use: Never    Sexual activity: Not on file   Lifestyle    Physical activity     Days per week: Not on file     Minutes per session: Not on file    Stress: Not on file   Relationships    Social connections Talks on phone: Not on file     Gets together: Not on file     Attends Muslim service: Not on file     Active member of club or organization: Not on file     Attends meetings of clubs or organizations: Not on file     Relationship status: Not on file    Intimate partner violence     Fear of current or ex partner: Not on file     Emotionally abused: Not on file     Physically abused: Not on file     Forced sexual activity: Not on file   Other Topics Concern    Not on file   Social History Narrative    Not on file         ALLERGIES: Patient has no known allergies. Review of Systems   Constitutional: Negative for activity change, appetite change and fatigue. HENT: Negative for ear pain, facial swelling, sore throat and trouble swallowing. Eyes: Negative for pain, discharge and visual disturbance. Respiratory: Negative for chest tightness, shortness of breath and wheezing. Cardiovascular: Negative for chest pain and palpitations. Gastrointestinal: Negative for abdominal pain, blood in stool, nausea and vomiting. Genitourinary: Negative for difficulty urinating, flank pain and hematuria. Musculoskeletal: Negative for arthralgias, joint swelling, myalgias and neck pain. Bilateral shoulder and left thumb pain   Skin: Negative for color change and rash. Neurological: Negative for dizziness, weakness, numbness and headaches. Hematological: Negative for adenopathy. Does not bruise/bleed easily. Psychiatric/Behavioral: Negative for behavioral problems, confusion and sleep disturbance. All other systems reviewed and are negative. Vitals:    02/08/21 1702   BP: 130/89   Pulse: 70   Resp: 18   Temp: 97.9 °F (36.6 °C)   SpO2: 100%            Physical Exam  Vitals signs and nursing note reviewed. Constitutional:       General: She is not in acute distress. Appearance: She is well-developed.       Comments: Patient appears in a moderate degree of distress with discomfort in her shoulders and left thumb. Signs are as noted. HENT:      Head: Normocephalic and atraumatic. Nose: Nose normal.   Eyes:      General: No scleral icterus. Conjunctiva/sclera: Conjunctivae normal.      Pupils: Pupils are equal, round, and reactive to light. Neck:      Musculoskeletal: Normal range of motion and neck supple. Thyroid: No thyromegaly. Vascular: No JVD. Trachea: No tracheal deviation. Comments: No carotid bruits noted. Cardiovascular:      Rate and Rhythm: Normal rate and regular rhythm. Heart sounds: Normal heart sounds. No murmur. No friction rub. No gallop. Pulmonary:      Effort: Pulmonary effort is normal. No respiratory distress. Breath sounds: Normal breath sounds. No wheezing or rales. Chest:      Chest wall: No tenderness. Abdominal:      General: Bowel sounds are normal. There is no distension. Palpations: Abdomen is soft. There is no mass. Tenderness: There is no abdominal tenderness. There is no guarding or rebound. Musculoskeletal: Normal range of motion. General: No tenderness. Comments: There is no significant tenderness to palpation of either shoulder. He seemed a little full. Movement produces some discomfort. The thumb on the left hand does not appear swollen. There is some localized mild discomfort over the first metacarpal.   Lymphadenopathy:      Cervical: No cervical adenopathy. Skin:     General: Skin is warm and dry. Findings: No erythema or rash. Neurological:      Mental Status: She is alert and oriented to person, place, and time. Cranial Nerves: No cranial nerve deficit. Coordination: Coordination normal.      Deep Tendon Reflexes: Reflexes are normal and symmetric. Psychiatric:         Behavior: Behavior normal.         Thought Content:  Thought content normal.         Judgment: Judgment normal.          MDM  Number of Diagnoses or Management Options     Amount and/or Complexity of Data Reviewed  Decide to obtain previous medical records or to obtain history from someone other than the patient: yes  Review and summarize past medical records: yes    Risk of Complications, Morbidity, and/or Mortality  Presenting problems: moderate  Diagnostic procedures: minimal  Management options: moderate    Patient Progress  Patient progress: stable         Procedures    We will discharge the patient on Toradol and to follow-up with Dr. Keira Desir as soon as possible for possible additional treatments. She has a wrist splint thumb restraint on the left hand and wrist.  This seems to help her and she is advised to continue that splint. She states she will follow up as directed.

## 2021-02-15 ENCOUNTER — OFFICE VISIT (OUTPATIENT)
Dept: FAMILY MEDICINE CLINIC | Age: 51
End: 2021-02-15

## 2021-02-15 VITALS
SYSTOLIC BLOOD PRESSURE: 146 MMHG | TEMPERATURE: 97.8 F | HEIGHT: 66 IN | BODY MASS INDEX: 32.3 KG/M2 | DIASTOLIC BLOOD PRESSURE: 94 MMHG | WEIGHT: 201 LBS | HEART RATE: 85 BPM | RESPIRATION RATE: 16 BRPM | OXYGEN SATURATION: 100 %

## 2021-02-15 DIAGNOSIS — M25.511 RIGHT SHOULDER PAIN, UNSPECIFIED CHRONICITY: Primary | ICD-10-CM

## 2021-02-15 DIAGNOSIS — M67.911 BILATERAL ROTATOR CUFF DYSFUNCTION: ICD-10-CM

## 2021-02-15 DIAGNOSIS — M67.912 BILATERAL ROTATOR CUFF DYSFUNCTION: ICD-10-CM

## 2021-02-15 PROCEDURE — 99214 OFFICE O/P EST MOD 30 MIN: CPT | Performed by: FAMILY MEDICINE

## 2021-02-15 RX ORDER — NAPROXEN 375 MG/1
375 TABLET ORAL 2 TIMES DAILY WITH MEALS
Qty: 30 TAB | Refills: 0 | Status: SHIPPED | OUTPATIENT
Start: 2021-02-15 | End: 2021-07-09

## 2021-02-15 NOTE — PROGRESS NOTES
Jade Honeycutt is a 48 y.o. female who presents today for shoulder pain     Both shoulders hurting R>L now. R shoulder started 2-3 weeks ago. Was seen in ED on Tuesday following injury at work where boxes fell on her? Unsure of sizes of boxes. Pain was there before but that made it worse. Currently on Toradol given by ED which helps with the pain. Was seen by sports medicine on 7/7/20 and had subacromial steroid injection which she notes helped with pain for awhile but it has come back. Pt reports she has difficulty lifting above her heads at time. Never went to physical therapy last time     Data reviewed or ordered today:       Other problems include: There is no problem list on file for this patient. Medications:  Current Outpatient Medications   Medication Sig Dispense Refill    amLODIPine (NORVASC) 10 mg tablet TAKE 1 TABLET BY MOUTH ONCE DAILY 90 Tab 1    hydroCHLOROthiazide (HYDRODIURIL) 25 mg tablet Take 1 Tab by mouth daily. 90 Tab 1    cetirizine (ZyrTEC) 10 mg tablet Zyrtec 10 mg tablet   Take 1 tablet every day by oral route at bedtime for 90 days.  ketorolac (TORADOL) 10 mg tablet Take 1 Tab by mouth every six (6) hours as needed for Pain. 18 Tab 0    metoprolol tartrate (LOPRESSOR) 25 mg tablet Take 25 mg by mouth two (2) times a day.  potassium chloride (K-DUR, KLOR-CON) 20 mEq tablet TAKE 1 TABLET BY MOUTH ONCE DAILY 60 Tab 1       Allergies:  No Known Allergies    LMP:  No LMP recorded. Patient has had a hysterectomy.     Social History     Socioeconomic History    Marital status: SINGLE     Spouse name: Not on file    Number of children: Not on file    Years of education: Not on file    Highest education level: Not on file   Occupational History    Not on file   Social Needs    Financial resource strain: Not on file    Food insecurity     Worry: Not on file     Inability: Not on file    Transportation needs     Medical: Not on file     Non-medical: Not on file   Tobacco Use    Smoking status: Never Smoker    Smokeless tobacco: Never Used   Substance and Sexual Activity    Alcohol use: Yes     Comment: social    Drug use: Never    Sexual activity: Not on file   Lifestyle    Physical activity     Days per week: Not on file     Minutes per session: Not on file    Stress: Not on file   Relationships    Social connections     Talks on phone: Not on file     Gets together: Not on file     Attends Pent[employer]ism service: Not on file     Active member of club or organization: Not on file     Attends meetings of clubs or organizations: Not on file     Relationship status: Not on file    Intimate partner violence     Fear of current or ex partner: Not on file     Emotionally abused: Not on file     Physically abused: Not on file     Forced sexual activity: Not on file   Other Topics Concern    Not on file   Social History Narrative    Not on file       Family History   Problem Relation Age of Onset    Diabetes Father     Stroke Father            ROS:  Fever: no  Weight loss: no  Headaches:  no  Chest Pain:  no  SOB:  no  Cough:  no  Other significant ROS:        Physical Exam  Visit Vitals  BP (!) 146/94   Pulse 85   Temp 97.8 °F (36.6 °C) (Temporal)   Resp 16   Ht 5' 6\" (1.676 m)   Wt 201 lb (91.2 kg)   SpO2 100%   BMI 32.44 kg/m²     Constitutional: Appears well,  No acute distress, Vitals noted  Neck: General inspection and Thyroid normal.  No abnormal cervical or supraclavicular nodes. Lungs: Clear to auscultation, good respiratory effort, no wheezes, rales or rhonchi  Heart: Normal HR, Normal S1 and S2,  Regular rhythm. No cardiac murmurs.   No carotid    Shoulder:Left/Right      Deformity: None     ROM:  Forward Flexion: Active: 180 Passive: 180  ER at 90 degrees abduction: Active: 90 Passive:90  IR at 90 degrees abduction: Active: 90 Passive:90  Abduction: Active: 180 Passive: 180     Palpation:  AC tenderness: Yes mainly on R   SC tenderness: None  Clavicle tenderness: None  Biceps tenderness: None     Strength:  Empty Can(Supraspinatus): Left:5/5 Right:5/5  External rotation(Infraspinatus): Left:5/5 Right: 5/5  Lift off(Subscapularis): Left:5/5 Right: 5/5     Rotator Cuff Exam:  Neers sign: Positive  [place] sign: Positive  Painful Arc: Negative  Lift-off sign / Belly Press: Negative  Biceps/Labrum/AC Exam:  Yergasons Test: Negative  Speeds Test: Negative  OLashas Sign: Negative  Cross-chest adduction: Negative  Scarf Test: Negative     Neuro/Vascular:  Pulses intact, no edema, and neurologically intact  Skin: No obvious rash or skin breakdown     BP Readings from Last 3 Encounters:   02/15/21 (!) 146/94   02/08/21 130/89   11/20/20 121/79       Assessment/Plan:      ICD-10-CM ICD-9-CM    1. Right shoulder pain, unspecified chronicity  M25.511 719.41 XR SHOULDER RT AP/LAT MIN 2 V   2. Bilateral rotator cuff dysfunction  M67.911 726.10     M67.912       1. Right shoulder pain, unspecified chronicity  - XR SHOULDER RT AP/LAT MIN 2 V; Future- I personally reviewed No acute abnormality     2. Bilateral rotator cuff dysfunction: Likely overuse injury based on exam, discussed importance of scheduling physical therapy   - REFERRAL TO PHYSICAL THERAPY  - naproxen (NAPROSYN) 375 mg tablet; Take 1 Tab by mouth two (2) times daily (with meals). Dispense: 30 Tab; Refill: 0  - Ice 15 min 3-4 times a day after activity, heat 30 min 3-4 times a day   - shoulder exercises   - would like to follow up with Dr. Jamel Charles (sports medicine) who did injection last time, appointment made for tomorrow     Orders Placed This Encounter    XR SHOULDER RT AP/LAT MIN 2 V     Standing Status:   Future     Number of Occurrences:   1     Standing Expiration Date:   3/17/2022     Order Specific Question:   Is Patient Pregnant?      Answer:   No         Fior Rojas MD  [address]

## 2021-02-15 NOTE — LETTER
NOTIFICATION RETURN TO WORK / SCHOOL 
 
2/15/2021 4:40 PM 
 
Ms. Kristin Gama [facility] [address] [facility] [address] To Whom It May Concern: 
 
Maureen Rodriguez is currently under the care of [id number] [email or web address]. She will return to work on 2/22/21 If there are questions or concerns please have the patient contact our office. Sincerely, Crystal Doshi MD

## 2021-02-16 ENCOUNTER — OFFICE VISIT (OUTPATIENT)
Dept: FAMILY MEDICINE CLINIC | Age: 51
End: 2021-02-16
Payer: COMMERCIAL

## 2021-02-16 VITALS
SYSTOLIC BLOOD PRESSURE: 136 MMHG | HEIGHT: 66 IN | OXYGEN SATURATION: 98 % | DIASTOLIC BLOOD PRESSURE: 90 MMHG | BODY MASS INDEX: 31.5 KG/M2 | TEMPERATURE: 97.8 F | HEART RATE: 71 BPM | RESPIRATION RATE: 18 BRPM | WEIGHT: 196 LBS

## 2021-02-16 DIAGNOSIS — M75.51 SUBACROMIAL BURSITIS OF RIGHT SHOULDER JOINT: Primary | ICD-10-CM

## 2021-02-16 PROCEDURE — 20610 DRAIN/INJ JOINT/BURSA W/O US: CPT | Performed by: FAMILY MEDICINE

## 2021-02-16 PROCEDURE — 99214 OFFICE O/P EST MOD 30 MIN: CPT | Performed by: FAMILY MEDICINE

## 2021-02-16 RX ORDER — LIDOCAINE HYDROCHLORIDE 10 MG/ML
2 INJECTION INFILTRATION; PERINEURAL ONCE
Qty: 2 ML | Refills: 0
Start: 2021-02-16 | End: 2021-02-16

## 2021-02-16 RX ORDER — TRIAMCINOLONE ACETONIDE 40 MG/ML
40 INJECTION, SUSPENSION INTRA-ARTICULAR; INTRAMUSCULAR ONCE
Qty: 1 ML | Refills: 0
Start: 2021-02-16 | End: 2021-02-16

## 2021-02-16 NOTE — PROGRESS NOTES
Patient went to ED after a work incident on 21, but states she was in pain before this incident. She was seen yesterday by Dr. Jc Avalos for r shoulder pain. Identified pt with two pt identifiers(name and ). Reviewed record in preparation for visit and have obtained necessary documentation. Chief Complaint   Patient presents with    Shoulder Pain     Both shoulders. Was seen previously by Dr. Keira Desir for an injection in the left shoulder 2020. Now the right shoulder has worsened. Pain worsens when she works. Hurts too much to lift arm over her head, also hurts when she reaches for items        Health Maintenance Due   Topic    COVID-19 Vaccine (1 of 2)    DTaP/Tdap/Td series (1 - Tdap)    PAP AKA CERVICAL CYTOLOGY     Shingrix Vaccine Age 49> (1 of 2)    Colorectal Cancer Screening Combo     Breast Cancer Screen Mammogram     Flu Vaccine (1)       Visit Vitals  BP (!) 136/90 (BP 1 Location: Left upper arm, BP Patient Position: Sitting, BP Cuff Size: Adult)   Pulse 71   Temp 97.8 °F (36.6 °C) (Temporal)   Resp 18   Ht 5' 6\" (1.676 m)   Wt 196 lb (88.9 kg)   SpO2 98%   BMI 31.64 kg/m²         Coordination of Care Questionnaire:  :   1) Have you been to an emergency room, urgent care, or hospitalized since your last visit? If yes, where when, and reason for visit? no       2. Have seen or consulted any other health care provider since your last visit? If yes, where when, and reason for visit?   NO

## 2021-02-16 NOTE — PROGRESS NOTES
History of Present Illness     Chief Complaint   Patient presents with    Shoulder Pain     Both shoulders. Was seen previously by Dr. Riley Jacques for an injection in the left shoulder July 2020. Now the right shoulder has worsened. Pain worsens when she works. Hurts too much to lift arm over her head, also hurts when she reaches for items       Patient Identification  Laura Bryant is a 48 y.o. female complains of pain in the bilateral shoulder pain, right much worse than left. Has has shoulder pain for several weeks now that worsened after work injury one week ago where several boxes fell onto her shoulder. Pain worse with reaching forward and overhead movement. Previously seen 7/7/20 and received subacromial steroid injection which helped with pain for awhile. Received referral for PT from Dr. Pb Saleh yesterday. Xray yesterday 2/15 show no abnormality of the right shoulder. Past Medical History:   Diagnosis Date    Hypertension     Hypertensive retinopathy     Ovarian cyst      Family History   Problem Relation Age of Onset    Diabetes Father     Stroke Father      Current Outpatient Medications   Medication Sig Dispense Refill    amLODIPine (NORVASC) 10 mg tablet TAKE 1 TABLET BY MOUTH ONCE DAILY 90 Tab 1    hydroCHLOROthiazide (HYDRODIURIL) 25 mg tablet Take 1 Tab by mouth daily. 90 Tab 1    cetirizine (ZyrTEC) 10 mg tablet Zyrtec 10 mg tablet   Take 1 tablet every day by oral route at bedtime for 90 days.  naproxen (NAPROSYN) 375 mg tablet Take 1 Tab by mouth two (2) times daily (with meals). 30 Tab 0    metoprolol tartrate (LOPRESSOR) 25 mg tablet Take 25 mg by mouth two (2) times a day.  potassium chloride (K-DUR, KLOR-CON) 20 mEq tablet TAKE 1 TABLET BY MOUTH ONCE DAILY 60 Tab 1     No Known Allergies    Review of Systems  A comprehensive review of systems was negative except for that written in the HPI.      Physical Exam     Visit Vitals  BP (!) 136/90 (BP 1 Location: Left upper arm, BP Patient Position: Sitting, BP Cuff Size: Adult)   Pulse 71   Temp 97.8 °F (36.6 °C) (Temporal)   Resp 18   Ht 5' 6\" (1.676 m)   Wt 196 lb (88.9 kg)   SpO2 98%   BMI 31.64 kg/m²       Shoulder: right  Deformity: None    ROM:  Forward Flexion: Active: 180   Passive: 180  ER (0): Active: 45   Passive: 45  IR (0): Active: Behind the body to the level T8  Abduction: Active: 180   Passive: 180    Palpation:  AC tenderness: Yes  SC tenderness: None  Clavicle tenderness: None  Biceps tenderness: None  Trapezius tenderness: Yes    Strength (0-5/5):  Deltoid  Anterior: 5/5  Deltoid  Posterior: 5/5  Deltoid  Mid: 5/5  Supraspinatus: 5/5  External rotation: 4/5  Internal rotation: 4/5    Rotator Cuff Exam:  Neers sign: Negative   [place] sign: Positive    Empty can: Positive  Painful Arc: Negative  Lift-off sign / Belly Press: Negative    Biceps/Labrum/AC Exam:  Yergasons Test: Negative  Speeds Test: Negative  OLashas Sign: Negative  Cross-chest adduction: Negative    Neuro/Vascular:  Pulses intact, no edema, and neurologically intact    C-Spine:  Cervical motion: FROM without pain.   Cervical tenderness: None  Spurlings test: Negative      [facility]  OFFICE PROCEDURE PROGRESS NOTE        Chart reviewed for the following:   Tiffanie Mixon MD, have reviewed the History, Physical and updated the Allergic reactions for Kristin Blanco     TIME OUT performed immediately prior to start of procedure:   I, Guerita Miles MD, have performed the following reviews on Kristinalena Barriosoy prior to the start of the procedure:            * Patient was identified by name and date of birth   * Agreement on procedure being performed was verified  * Risks and Benefits explained to the patient  * Procedure site verified and marked as necessary  * Patient was positioned for comfort  * Consent was signed and verified     Time: 1400      Date of procedure: 2/17/2021    Procedure performed by:  Guerita Miles MD    Provider assisted by: Patricia Olivas DO    Patient assisted by: self    How tolerated by patient: tolerated the procedure well with no complications    Post Procedural Pain Scale: 2 - Hurts Little Bit      Indications:   Symptomatic relief of subacromial bursitis    Procedure: Right Subacromial Bursitis Injection  After verbal consent was obtained, risks and benefits of the procedure were discussed with the patient and alternatives discussed. Time out performed, cross checking patient ID and procedure. Confirmed that the patient does not have history of prior adverse reactions, active infections, or relevant allergies. There was no effusion, erythema, or warmth, and the skin was clear. The skin was cleaned using chlorohexadine x 2. Topical anesthesia was achieved with ethyl chloride. A 25 gauge needle was inserted into the right subacromial bursa via a lateral approach. The site was injected with a mixture of 1mg of Kenalog and  3ml 1% Lidocaine. The injection was completed without complication, and a bandage was applied. Patient felt 50% better after injection. No results found for any visits on 02/16/21. Assessment:    ICD-10-CM ICD-9-CM    1. Subacromial bursitis of right shoulder joint  M75.51 726.19 TRIAMCINOLONE ACETONIDE INJ      triamcinolone acetonide (Kenalog) 40 mg/mL injection      lidocaine (XYLOCAINE) 10 mg/mL (1 %) injection      MI DRAIN/INJECT LARGE JOINT/BURSA       Plan:   -Subacromial bursitis injection today   -Home Exercise Program: As discussed in clinic and per handout.  -Start Physical Therapy, referral sent  -If pain returns or no improvement with PT, consider AC joint injection, less likely GH joint. After Care Instructions: The patient is asked to continue to rest the joint for a few more days before resuming regular activities. It may be more painful for the first 1-2 days. Watch for fever, or increased swelling or persistent pain in the joint.  Call or return to clinic prn if such symptoms occur or there is failure to improve as anticipated. Follow-up and Dispositions    · Return in about 6 weeks (around 3/30/2021) for If no improvement of worsening.

## 2021-02-25 DIAGNOSIS — R71.8 ELEVATED MCV: Primary | ICD-10-CM

## 2021-02-25 NOTE — PROGRESS NOTES
TSH, CMP WNL  A1c 5.2% - can rescreen in 3 years or for sx  CBC with elevated MCV - will redraw with peripheral smear    [employer] message sent

## 2021-03-23 ENCOUNTER — PATIENT MESSAGE (OUTPATIENT)
Dept: FAMILY MEDICINE CLINIC | Age: 51
End: 2021-03-23

## 2021-03-24 NOTE — TELEPHONE ENCOUNTER
----- Message from Jeni Emelina sent at 3/23/2021  6:36 PM EDT -----  Regarding: Non-Urgent Medical Question  Contact: [phone]  Im messaging you again not sure if you received my massage I sent to you on 3/21/2021. I need to see you again about my left shoulder. I went back to work last week. Im having problem working and Sleeping at night do to the pain. Im leaving work early and sometimes not going. Im asking do I need to go further and have a MRI.   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

## 2021-03-26 ENCOUNTER — TRANSCRIBE ORDER (OUTPATIENT)
Dept: SCHEDULING | Age: 51
End: 2021-03-26

## 2021-03-26 ENCOUNTER — OFFICE VISIT (OUTPATIENT)
Dept: FAMILY MEDICINE CLINIC | Age: 51
End: 2021-03-26
Payer: COMMERCIAL

## 2021-03-26 VITALS
BODY MASS INDEX: 32.82 KG/M2 | DIASTOLIC BLOOD PRESSURE: 82 MMHG | HEART RATE: 83 BPM | RESPIRATION RATE: 16 BRPM | OXYGEN SATURATION: 98 % | SYSTOLIC BLOOD PRESSURE: 122 MMHG | TEMPERATURE: 98.4 F | WEIGHT: 197 LBS | HEIGHT: 65 IN

## 2021-03-26 DIAGNOSIS — G89.29 CHRONIC RIGHT SHOULDER PAIN: Primary | ICD-10-CM

## 2021-03-26 DIAGNOSIS — M25.511 CHRONIC RIGHT SHOULDER PAIN: Primary | ICD-10-CM

## 2021-03-26 DIAGNOSIS — G89.29 CHRONIC SHOULDER PAIN: Primary | ICD-10-CM

## 2021-03-26 DIAGNOSIS — M25.519 CHRONIC SHOULDER PAIN: Primary | ICD-10-CM

## 2021-03-26 PROCEDURE — 99213 OFFICE O/P EST LOW 20 MIN: CPT | Performed by: FAMILY MEDICINE

## 2021-03-26 NOTE — PROGRESS NOTES
Chief Complaint   Patient presents with    Shoulder Pain     Patient presents to follow up on Left shoulder pain; also complaining of Right shoulder pain; pain never subsided; also has been to physical therapy. Visit Vitals  /82 (BP 1 Location: Left upper arm, BP Patient Position: Sitting, BP Cuff Size: Large adult)   Pulse 83   Temp 98.4 °F (36.9 °C) (Temporal)   Resp 16   Ht 5' 5.25\" (1.657 m)   Wt 197 lb (89.4 kg)   SpO2 98%   BMI 32.53 kg/m²        1. Have you been to the ER, urgent care clinic since your last visit? Hospitalized since your last visit? No     2. Have you seen or consulted any other health care providers outside of the [address] [place] [address] since your last visit? Include any pap smears or colon screening.  No

## 2021-03-26 NOTE — PROGRESS NOTES
History of Present Illness     Patient Identification  Inder Troncoso is a 48 y.o. female complains of pain in the right shoulder pain for about 2 months. She works at [employer] and boxes fell onto her shoulder while she was lifting boxes. She was seen in clinic about 6 weeks ago. Radiographs of the shoulder were WNL. She was giving a subacromial CSI but she had minimal relief after the injection. She has been doing HEP and PT without significant relief of pain. No neck pain. No radiating pain. No cp/sob. She is taking aleve to help with pain. Past Medical History:   Diagnosis Date    Hypertension     Hypertensive retinopathy     Ovarian cyst      Family History   Problem Relation Age of Onset    Diabetes Father     Stroke Father      Current Outpatient Medications   Medication Sig Dispense Refill    naproxen (NAPROSYN) 375 mg tablet Take 1 Tab by mouth two (2) times daily (with meals). 30 Tab 0    amLODIPine (NORVASC) 10 mg tablet TAKE 1 TABLET BY MOUTH ONCE DAILY 90 Tab 1    hydroCHLOROthiazide (HYDRODIURIL) 25 mg tablet Take 1 Tab by mouth daily. 90 Tab 1    cetirizine (ZyrTEC) 10 mg tablet Zyrtec 10 mg tablet   Take 1 tablet every day by oral route at bedtime for 90 days.  metoprolol tartrate (LOPRESSOR) 25 mg tablet Take 25 mg by mouth two (2) times a day.  potassium chloride (K-DUR, KLOR-CON) 20 mEq tablet TAKE 1 TABLET BY MOUTH ONCE DAILY 60 Tab 1     No Known Allergies    Review of Systems  Pertinent items are noted in HPI. Physical Exam     Visit Vitals  /82 (BP 1 Location: Left upper arm, BP Patient Position: Sitting, BP Cuff Size: Large adult)   Pulse 83   Temp 98.4 °F (36.9 °C) (Temporal)   Resp 16   Ht 5' 5.25\" (1.657 m)   Wt 197 lb (89.4 kg)   SpO2 98%   BMI 32.53 kg/m²       GEN: Well appearing. No apparent distress. Responds to all questions appropriately. Lungs: No labored respirations. Talking in complete sentences without difficulty.   Shoulder: right  Deformity: None    ROM:  Forward Flexion: Active: 170     ER (0): Active: 35  IR (0): Active: Behind the body to the level L5 due to pain  Abduction: Active: 170       Palpation:  AC tenderness: none  SC tenderness: None  Clavicle tenderness: None  Biceps tenderness: None    Strength (0-5/5):  Deltoid  Anterior: 5/5  Deltoid  Posterior: 5/5  Deltoid  Mid: 5/5  Supraspinatus: 4+/5  External rotation: 4+/5  Internal rotation: 4+/5    Rotator Cuff Exam:  Neers sign: positive  [place] sign: positive  Painful Arc: positive  Lift-off sign / Belly Press: Negative    Neuro/Vascular:  Pulses intact, no edema, and neurologically intact    C-Spine:  Cervical motion: FROM without pain. Cervical tenderness: None    Skin: No obvious rash or skin breakdown. Assessment:  Right shoulder pain: Weakness on exam and unresponsive to CSI and PT. Will get MRI for further evaluation. Plan:  1. MRI of the right shoulder for further evaluation. Medications:    1. Naproxin (Aleve): 220mg 1-2 tablets twice a day PRN. 2. Acetaminophen (Tylenol):  500mg 1-2 tablets every 6 hours as needed for pain.     RTC: After MRI

## 2021-03-26 NOTE — LETTER
3/26/2021 2:17 PM 
 
Ms. Kristin Spivey [place] [address] [facility] [address] To whom it may concern; the patient  Salvador Chacon is currently under my care. I am currently  
 
requesting light duty with no overhead lifting over 5 pounds until after further evaluation of MRI that is  
 
currently scheduled for April 5, 2021.    
 
 
Sincerely, 
 
 
Abrahan Ledezma MD

## 2021-04-05 ENCOUNTER — HOSPITAL ENCOUNTER (OUTPATIENT)
Dept: MRI IMAGING | Age: 51
Discharge: HOME | End: 2021-04-05
Attending: FAMILY MEDICINE
Payer: COMMERCIAL

## 2021-04-05 DIAGNOSIS — M25.511 CHRONIC RIGHT SHOULDER PAIN: ICD-10-CM

## 2021-04-05 DIAGNOSIS — G89.29 CHRONIC RIGHT SHOULDER PAIN: ICD-10-CM

## 2021-04-05 PROCEDURE — 73221 MRI JOINT UPR EXTREM W/O DYE: CPT

## 2021-04-08 ENCOUNTER — TELEPHONE (OUTPATIENT)
Dept: FAMILY MEDICINE CLINIC | Age: 51
End: 2021-04-08

## 2021-04-08 NOTE — TELEPHONE ENCOUNTER
----- Message from Winifred Schmitt sent at 4/8/2021 10:49 AM EDT -----  Regarding: Dr. Mio Thomas: [phone]  Patient return call    Caller's first and last name and relationship (if not the patient): N/A      Best contact number(s): [phone]      Whose call is being returned: Office       Details to clarify the request: Returning call received at 10:42am.       Winifred Schmitt

## 2021-04-12 ENCOUNTER — TELEPHONE (OUTPATIENT)
Dept: FAMILY MEDICINE CLINIC | Age: 51
End: 2021-04-12

## 2021-04-12 ENCOUNTER — OFFICE VISIT (OUTPATIENT)
Dept: FAMILY MEDICINE CLINIC | Age: 51
End: 2021-04-12
Payer: COMMERCIAL

## 2021-04-12 VITALS
DIASTOLIC BLOOD PRESSURE: 79 MMHG | SYSTOLIC BLOOD PRESSURE: 121 MMHG | HEART RATE: 80 BPM | WEIGHT: 199.2 LBS | TEMPERATURE: 97.8 F | RESPIRATION RATE: 16 BRPM | OXYGEN SATURATION: 99 % | HEIGHT: 65 IN | BODY MASS INDEX: 33.19 KG/M2

## 2021-04-12 DIAGNOSIS — M75.111 INCOMPLETE TEAR OF RIGHT ROTATOR CUFF, UNSPECIFIED WHETHER TRAUMATIC: ICD-10-CM

## 2021-04-12 DIAGNOSIS — R89.8 ABNORMAL BONE MARROW EXAMINATION: ICD-10-CM

## 2021-04-12 DIAGNOSIS — M24.011 LOOSE BODY IN RIGHT SHOULDER: ICD-10-CM

## 2021-04-12 DIAGNOSIS — M25.511 RIGHT SHOULDER PAIN, UNSPECIFIED CHRONICITY: Primary | ICD-10-CM

## 2021-04-12 PROCEDURE — 99213 OFFICE O/P EST LOW 20 MIN: CPT | Performed by: FAMILY MEDICINE

## 2021-04-12 RX ORDER — FLUTICASONE PROPIONATE 50 MCG
2 SPRAY, SUSPENSION (ML) NASAL
COMMUNITY

## 2021-04-12 NOTE — PROGRESS NOTES
Chief Complaint   Patient presents with    Follow-up     discuss MRI results, lab work     Visit Vitals  /79 (BP 1 Location: Left upper arm, BP Patient Position: Sitting, BP Cuff Size: Large adult)   Pulse 80   Temp 97.8 °F (36.6 °C) (Temporal)   Resp 16   Ht 5' 5.25\" (1.657 m)   Wt 199 lb 3.2 oz (90.4 kg)   SpO2 99%   BMI 32.90 kg/m²     1. Have you been to the ER, urgent care clinic since your last visit? Hospitalized since your last visit? No    2. Have you seen or consulted any other health care providers outside of the [address] [place] [address] since your last visit? Include any pap smears or colon screening.  No

## 2021-04-12 NOTE — PROGRESS NOTES
History of Present Illness     Patient Identification  Sorin Arciniega is a 48 y.o. female complains of pain in the right shoulder pain. Presenting for R should injury follow up. She works at [employer] and boxes fell onto her R shoulder while she was lifting boxes about ~ 3 months ago. Pt received a subacromial CSI but she reports minimal relief after the injection. Pt continues having constant pain which is aggravated She has been doing HEP and PT without significant relief of pain. No neck pain. No radiating pain. No cp/sob. She is taking aleve to help with pain    Past Medical History:   Diagnosis Date    Hypertension     Hypertensive retinopathy     Ovarian cyst      Family History   Problem Relation Age of Onset    Diabetes Father     Stroke Father      Current Outpatient Medications   Medication Sig Dispense Refill    fluticasone propionate (Flonase Allergy Relief) 50 mcg/actuation nasal spray 2 Sprays by Both Nostrils route daily as needed.  amLODIPine (NORVASC) 10 mg tablet TAKE 1 TABLET BY MOUTH ONCE DAILY 90 Tab 1    hydroCHLOROthiazide (HYDRODIURIL) 25 mg tablet Take 1 Tab by mouth daily. 90 Tab 1    cetirizine (ZyrTEC) 10 mg tablet Zyrtec 10 mg tablet   Take 1 tablet every day by oral route at bedtime for 90 days.  naproxen (NAPROSYN) 375 mg tablet Take 1 Tab by mouth two (2) times daily (with meals). 30 Tab 0    metoprolol tartrate (LOPRESSOR) 25 mg tablet Take 25 mg by mouth two (2) times a day.  potassium chloride (K-DUR, KLOR-CON) 20 mEq tablet TAKE 1 TABLET BY MOUTH ONCE DAILY 60 Tab 1     No Known Allergies    Review of Systems  Pertinent items are noted in HPI. Physical Exam     Visit Vitals  /79 (BP 1 Location: Left upper arm, BP Patient Position: Sitting, BP Cuff Size: Large adult)   Pulse 80   Temp 97.8 °F (36.6 °C) (Temporal)   Resp 16   Ht 5' 5.25\" (1.657 m)   Wt 199 lb 3.2 oz (90.4 kg)   SpO2 99%   BMI 32.90 kg/m²       GEN: Well appearing.  No apparent distress. Responds to all questions appropriately. Lungs: No labored respirations. Talking in complete sentences without difficulty. Shoulder: right  Deformity: None    ROM:  Forward Flexion: Active: 120  ER (0): Active: 45  IR (0): Active: Behind the body to the level T10  Abduction: Active: 120    Palpation:  AC tenderness: None  SC tenderness: None  Clavicle tenderness: None  Biceps tenderness: None    Strength (0-5/5):  Deltoid  Anterior: 5/5  Deltoid  Posterior: 5/5  Deltoid  Mid: 5/5  Supraspinatus: 5/5  External rotation: 5/5  Internal rotation: 5/5    Rotator Cuff Exam:  Neers sign: positive  [place] sign: positive  Painful Arc: positive    Neuro/Vascular:  Pulses intact, no edema, and neurologically intact    C-Spine:  Cervical motion: FROM without pain. Cervical tenderness: None    Skin: No obvious rash or skin breakdown. MRI shoulder 4/5/2021:  IMPRESSION  1. Large glenohumeral effusion with loose bodies and/or synovitis within the  axillary pouch biceps long head tendon sheath. Possible cartilage defect  superior central humeral head  2. Small shallow partial-thickness interstitial tear mid supraspinatus insertion  3. Prominent red marrow pattern. While this may be hyperplastic red marrow  process other infiltrative processes can have this appearance. Hematologic evaluation is recommended    Assessment/Plan:  Right shoulder pain: Synovitis/Loose body. Less likely due to small partial tear but possible. Unresponsive to PT and CSI. [facility]. Continue PT. Discussed HEP especially ROM exercises to prevent frozen shoulder. Red marrow patter on MRI Will check CBC w/ Diff, Peripheral Smear, and Retic Count for further evaluation. Medications:    1. Naproxin (Aleve): 220mg 1-2 tablets twice a day PRN. 2. Acetaminophen (Tylenol):  500mg 1-2 tablets every 6 hours as needed for pain. RTC: TBD after labs. Referred to Ortho.

## 2021-04-12 NOTE — TELEPHONE ENCOUNTER
LVM making pt aware of [facility] appointment next Thursday, April 22nd at 9:20am with Dr. Alea De León.

## 2021-04-13 LAB
BASOPHILS # BLD: 0.1 K/UL (ref 0–0.1)
BASOPHILS NFR BLD: 1 % (ref 0–1)
DIFFERENTIAL METHOD BLD: NORMAL
EOSINOPHIL # BLD: 0.1 K/UL (ref 0–0.4)
EOSINOPHIL NFR BLD: 2 % (ref 0–7)
ERYTHROCYTE [DISTWIDTH] IN BLOOD BY AUTOMATED COUNT: 13.7 % (ref 11.5–14.5)
HCT VFR BLD AUTO: 38.8 % (ref 35–47)
HGB BLD-MCNC: 13.2 G/DL (ref 11.5–16)
IMM GRANULOCYTES # BLD AUTO: 0 K/UL (ref 0–0.04)
IMM GRANULOCYTES NFR BLD AUTO: 0 % (ref 0–0.5)
LYMPHOCYTES # BLD: 2 K/UL (ref 0.8–3.5)
LYMPHOCYTES NFR BLD: 29 % (ref 12–49)
MCH RBC QN AUTO: 33.4 PG (ref 26–34)
MCHC RBC AUTO-ENTMCNC: 34 G/DL (ref 30–36.5)
MCV RBC AUTO: 98.2 FL (ref 80–99)
MONOCYTES # BLD: 0.5 K/UL (ref 0–1)
MONOCYTES NFR BLD: 7 % (ref 5–13)
NEUTS SEG # BLD: 4.3 K/UL (ref 1.8–8)
NEUTS SEG NFR BLD: 61 % (ref 32–75)
NRBC # BLD: 0 K/UL (ref 0–0.01)
NRBC BLD-RTO: 0 PER 100 WBC
PERIPHERAL SMEAR,PSM: NORMAL
PLATELET # BLD AUTO: 228 K/UL (ref 150–400)
PMV BLD AUTO: 11.5 FL (ref 8.9–12.9)
RBC # BLD AUTO: 3.95 M/UL (ref 3.8–5.2)
RETICS # AUTO: 0.11 M/UL (ref 0.02–0.08)
RETICS/RBC NFR AUTO: 2.7 % (ref 0.7–2.1)
WBC # BLD AUTO: 7 K/UL (ref 3.6–11)

## 2021-05-10 ENCOUNTER — TRANSCRIBE ORDER (OUTPATIENT)
Dept: REGISTRATION | Age: 51
End: 2021-05-10

## 2021-05-10 ENCOUNTER — HOSPITAL ENCOUNTER (OUTPATIENT)
Dept: NON INVASIVE DIAGNOSTICS | Age: 51
Discharge: HOME | End: 2021-05-10
Payer: OTHER MISCELLANEOUS

## 2021-05-10 DIAGNOSIS — Z01.818 PRE-OPERATIVE EXAMINATION: ICD-10-CM

## 2021-05-10 DIAGNOSIS — Z01.818 PRE-OPERATIVE EXAMINATION: Primary | ICD-10-CM

## 2021-05-10 LAB
ATRIAL RATE: 79 BPM
CALCULATED P AXIS, ECG09: 58 DEGREES
CALCULATED R AXIS, ECG10: 0 DEGREES
CALCULATED T AXIS, ECG11: 4 DEGREES
DIAGNOSIS, 93000: NORMAL
P-R INTERVAL, ECG05: 178 MS
Q-T INTERVAL, ECG07: 400 MS
QRS DURATION, ECG06: 80 MS
QTC CALCULATION (BEZET), ECG08: 458 MS
VENTRICULAR RATE, ECG03: 79 BPM

## 2021-05-10 PROCEDURE — 93005 ELECTROCARDIOGRAM TRACING: CPT

## 2021-07-09 ENCOUNTER — OFFICE VISIT (OUTPATIENT)
Dept: FAMILY MEDICINE CLINIC | Age: 51
End: 2021-07-09
Payer: COMMERCIAL

## 2021-07-09 VITALS
BODY MASS INDEX: 31.82 KG/M2 | HEIGHT: 66 IN | RESPIRATION RATE: 16 BRPM | HEART RATE: 71 BPM | TEMPERATURE: 98.5 F | OXYGEN SATURATION: 94 % | WEIGHT: 198 LBS | SYSTOLIC BLOOD PRESSURE: 112 MMHG | DIASTOLIC BLOOD PRESSURE: 76 MMHG

## 2021-07-09 DIAGNOSIS — G89.29 CHRONIC PAIN OF RIGHT KNEE: Primary | ICD-10-CM

## 2021-07-09 DIAGNOSIS — M25.561 CHRONIC PAIN OF RIGHT KNEE: Primary | ICD-10-CM

## 2021-07-09 DIAGNOSIS — I10 ESSENTIAL HYPERTENSION: ICD-10-CM

## 2021-07-09 DIAGNOSIS — M25.561 PATELLOFEMORAL ARTHRALGIA OF RIGHT KNEE: ICD-10-CM

## 2021-07-09 DIAGNOSIS — E66.9 OBESITY (BMI 30-39.9): Chronic | ICD-10-CM

## 2021-07-09 PROBLEM — M24.011 LOOSE BODY OF RIGHT SHOULDER: Status: ACTIVE | Noted: 2021-04-23

## 2021-07-09 PROBLEM — J30.2 SEASONAL ALLERGIES: Status: ACTIVE | Noted: 2019-01-07

## 2021-07-09 PROBLEM — H04.123 DRY EYES: Status: ACTIVE | Noted: 2019-01-28

## 2021-07-09 PROBLEM — I37.1 PULMONIC VALVE REGURGITATION: Status: ACTIVE | Noted: 2019-01-24

## 2021-07-09 PROBLEM — H35.039 HYPERTENSIVE RETINOPATHY: Status: ACTIVE | Noted: 2019-01-28

## 2021-07-09 PROBLEM — I07.1 TRICUSPID VALVE REGURGITATION: Status: ACTIVE | Noted: 2019-01-17

## 2021-07-09 PROBLEM — M75.01 ADHESIVE CAPSULITIS OF RIGHT SHOULDER: Status: ACTIVE | Noted: 2021-04-23

## 2021-07-09 PROBLEM — I34.0 MITRAL VALVE REGURGITATION: Status: ACTIVE | Noted: 2019-01-17

## 2021-07-09 PROCEDURE — 99214 OFFICE O/P EST MOD 30 MIN: CPT | Performed by: STUDENT IN AN ORGANIZED HEALTH CARE EDUCATION/TRAINING PROGRAM

## 2021-07-09 RX ORDER — DIPHENHYDRAMINE HCL 25 MG
CAPSULE ORAL AS NEEDED
COMMUNITY
Start: 2021-02-12

## 2021-07-09 RX ORDER — AMLODIPINE BESYLATE 10 MG/1
TABLET ORAL
Qty: 90 TABLET | Refills: 3 | Status: SHIPPED | OUTPATIENT
Start: 2021-07-09 | End: 2022-08-09

## 2021-07-09 RX ORDER — OXYCODONE AND ACETAMINOPHEN 5; 325 MG/1; MG/1
1 TABLET ORAL
COMMUNITY
Start: 2021-05-14

## 2021-07-09 RX ORDER — HYDROCHLOROTHIAZIDE 25 MG/1
25 TABLET ORAL DAILY
Qty: 90 TABLET | Refills: 3 | Status: SHIPPED | OUTPATIENT
Start: 2021-07-09 | End: 2022-08-09

## 2021-07-09 NOTE — PROGRESS NOTES
Omar Cockayne is a 46 y.o. female    Chief Complaint   Patient presents with    Knee Pain     pain in right knee only when bending (walking up steps) pain a 7, currently no pain,        1. Have you been to the ER, urgent care clinic since your last visit? Hospitalized since your last visit? No  M  2. Have you seen or consulted any other health care providers outside of the [address] [place] [address] since your last visit? Include any pap smears or colon screening.  No      Visit Vitals  /76 (BP 1 Location: Left arm, BP Patient Position: Sitting)   Pulse 71   Temp 98.5 °F (36.9 °C) (Oral)   Resp 16   Ht 5' 6\" (1.676 m)   Wt 198 lb (89.8 kg)   SpO2 94%   BMI 31.96 kg/m²

## 2021-07-09 NOTE — PROGRESS NOTES
[address] [place] [address]   Office [phone]  Fax [phone]     7/9/2021   Chief Complaint   Patient presents with    Knee Pain     pain in right knee only when bending (walking up steps) pain a 7, currently no pain,        HPI:  Elmer Maxwell is a 46 y.o. female who presents to clinic today for R knee pain and HTN refills. R Knee pain: Currently not experiencing any pain. Sometimes experiences pain in anterolateral part when walking up stairs or bending knee. Pain is described as achy with variable severity and going on for several weeks. No erythema, heat or swelling. No other joint problems. Denies trauma, falls. HTN: On Norvasc and HCTZ. Not keep BP log. No dizziness, lightheadedness, HA. Patients past medical, surgical and family histories were reviewed. Allergies and Medications reviewed and updated. Review of Systems   Constitutional: Negative for chills and fever. Musculoskeletal: Negative for falls and myalgias. Neurological: Negative for dizziness, tingling, weakness and headaches. Objective:  Vitals:    07/09/21 0949   BP: 112/76   Pulse: 71   Resp: 16   Temp: 98.5 °F (36.9 °C)   TempSrc: Oral   SpO2: 94%   Weight: 198 lb (89.8 kg)   Height: 5' 6\" (1.676 m)     Body mass index is 31.96 kg/m². Physical Exam  General: Patient alert and oriented and in NAD  Heart: Regular rate and rhythm, No murmurs, rubs or gallops.     Lungs: Clear to auscultation bilaterally, no wheezing, rales or rhonchi  Ext: No edema  Skin: No rashes or lesions noted on exposed skin  Psych: Appropriate mood and affect    Musculoskeletal:  Knee: right  Knee Effusion: None  Quadriceps atrophy: None   Popliteal (Bakers) Cyst: Negative   Patellofemoral crepitus: Negative    ROM:  Flexion: 130  Extension: 0   Hip IR/ER: FROM without pain    Alignment:  Foot: symmetrical     Dynamic Test:  Gait: Normal   Assistive devices: None  One leg squat: pain in R knee     Palpation: Patella tenderness: None  Patellar tendon tenderness: None  Quad tendon tenderness: None  Medial joint line tenderness: None  Lateral joint line tenderness: None  MCL tenderness: None  LCL tenderness: None  Medial facet tenderness: None  Lateral facet tenderness: None  Condyle tenderness: None  Tibia tubercle tenderness: None  Proximal fibula tenderness: None  Plica tenderness: None  Prepatellar bursa tenderness: None  Pes bursa tenderness: None  ITB tenderness: None    Ligament/Meniscal Exam:  Patellar Grind: Negative   Patellar apprehension (medial/lateral): Negative   Anterior Drawer: Negative   Posterior Drawer: Negative   Valgus stress: Negative with good endpoint   Varus stress: Negative with good endpoint   Dial test: Negative   Octavio: Negative   Ashwini sign: Negative. Strength (0-5/5):   Flexion: Left: 5/5    Right: 5/5    Extension: Left: 5/5    Right: 5/5    Hip abduction: 5/5    Hip adduction: 5/5              No results found for this or any previous visit (from the past 24 hour(s)). Assessment and Plan:  1. Chronic pain of right knee / 2. Patellofemoral arthralgia of right knee  - Exercise handout given  - Referred to PT   - Follow up with sports medicine if worsening/ not improving/ new symptoms appear    3. Essential hypertension  - BP well controlled  - amLODIPine (NORVASC) 10 mg tablet; TAKE 1 TABLET BY MOUTH ONCE DAILY  Dispense: 90 Tablet; Refill: 3  - hydroCHLOROthiazide (HYDRODIURIL) 25 mg tablet; Take 1 Tablet by mouth daily. Dispense: 90 Tablet;  Refill: 3  - Will need to follow up yearly labs and annual physical in 1-2 months   - Reviewed labs:  Lab Results   Component Value Date/Time    Sodium 137 11/20/2020 03:19 PM    Potassium 3.7 11/20/2020 03:19 PM    Chloride 103 11/20/2020 03:19 PM    CO2 26 11/20/2020 03:19 PM    Anion gap 8 11/20/2020 03:19 PM    Glucose 92 11/20/2020 03:19 PM    BUN 7 11/20/2020 03:19 PM    Creatinine 0.65 11/20/2020 03:19 PM    BUN/Creatinine ratio 11 (L) 11/20/2020 03:19 PM    GFR est AA >60 11/20/2020 03:19 PM    GFR est non-AA >60 11/20/2020 03:19 PM    Calcium 9.3 11/20/2020 03:19 PM     Lab Results   Component Value Date/Time    WBC 7.0 04/12/2021 11:50 AM    HGB 13.2 04/12/2021 11:50 AM    HCT 38.8 04/12/2021 11:50 AM    PLATELET 335 68/60/8874 11:50 AM    MCV 98.2 04/12/2021 11:50 AM       4. Obesity  - Counseling lifestyle modifications  - Follow up for health screenings  - Reviewed labs:  Lab Results   Component Value Date/Time    Hemoglobin A1c 5.2 11/20/2020 03:19 PM       I have discussed the aforementioned diagnoses and plan with the patient in detail. I have provided information in person and/or in AVS. All questions answered prior to discharge.      I discussed this patient with Dr. Carlos Antoine (Attending Physician)   Signed By:  Joan Mccain MD     Family Medicine Resident

## 2021-07-09 NOTE — PATIENT INSTRUCTIONS
Please call to schedule your appointment with provider/location then call our office back @ #[phone] to leave a message for our referral coordinator with the appointment information (date/time/providers full name) for whom you will be seeing for this referral order so that we can authorize your visit(s) with your insurance if needed and referral tracking purposes of this order.

## 2021-07-28 ENCOUNTER — TELEPHONE (OUTPATIENT)
Dept: FAMILY MEDICINE CLINIC | Age: 51
End: 2021-07-28

## 2021-07-28 NOTE — TELEPHONE ENCOUNTER
Called pt, pt states she will call back at later date to sched appt 7/28/21 9:12am/[facility]      ----- Message from Lynn Waldron MD sent at 7/9/2021 10:40 AM EDT -----  Regarding: CPE  Please add this patient to the list for well woman physical needed for next month. Thanks!

## 2021-08-12 ENCOUNTER — APPOINTMENT (OUTPATIENT)
Dept: MRI IMAGING | Age: 51
End: 2021-08-12
Attending: INTERNAL MEDICINE
Payer: COMMERCIAL

## 2021-08-12 ENCOUNTER — APPOINTMENT (OUTPATIENT)
Dept: CT IMAGING | Age: 51
End: 2021-08-12
Attending: EMERGENCY MEDICINE
Payer: COMMERCIAL

## 2021-08-12 ENCOUNTER — APPOINTMENT (OUTPATIENT)
Dept: GENERAL RADIOLOGY | Age: 51
End: 2021-08-12
Attending: EMERGENCY MEDICINE
Payer: COMMERCIAL

## 2021-08-12 ENCOUNTER — HOSPITAL ENCOUNTER (OUTPATIENT)
Age: 51
Setting detail: OBSERVATION
Discharge: HOME | End: 2021-08-13
Attending: EMERGENCY MEDICINE | Admitting: INTERNAL MEDICINE
Payer: COMMERCIAL

## 2021-08-12 DIAGNOSIS — E87.6 HYPOKALEMIA: ICD-10-CM

## 2021-08-12 DIAGNOSIS — R56.9 SEIZURE (HCC): Primary | ICD-10-CM

## 2021-08-12 LAB
ALBUMIN SERPL-MCNC: 4 G/DL (ref 3.5–5)
ALBUMIN/GLOB SERPL: 0.9 {RATIO} (ref 1.1–2.2)
ALP SERPL-CCNC: 54 U/L (ref 45–117)
ALT SERPL-CCNC: 22 U/L (ref 12–78)
ANION GAP SERPL CALC-SCNC: 5 MMOL/L (ref 5–15)
APPEARANCE UR: CLEAR
AST SERPL-CCNC: 11 U/L (ref 15–37)
BACTERIA URNS QL MICRO: NEGATIVE /HPF
BASOPHILS # BLD: 0.1 K/UL (ref 0–0.1)
BASOPHILS NFR BLD: 0 % (ref 0–1)
BILIRUB SERPL-MCNC: 0.4 MG/DL (ref 0.2–1)
BILIRUB UR QL: NEGATIVE
BUN SERPL-MCNC: 14 MG/DL (ref 6–20)
BUN/CREAT SERPL: 18 (ref 12–20)
CALCIUM SERPL-MCNC: 9.3 MG/DL (ref 8.5–10.1)
CAOX CRY URNS QL MICRO: ABNORMAL
CHLORIDE SERPL-SCNC: 107 MMOL/L (ref 97–108)
CK SERPL-CCNC: 105 U/L (ref 26–192)
CO2 SERPL-SCNC: 28 MMOL/L (ref 21–32)
COLOR UR: ABNORMAL
COMMENT, HOLDF: NORMAL
CREAT SERPL-MCNC: 0.79 MG/DL (ref 0.55–1.02)
DIFFERENTIAL METHOD BLD: ABNORMAL
EOSINOPHIL # BLD: 0 K/UL (ref 0–0.4)
EOSINOPHIL NFR BLD: 0 % (ref 0–7)
EPITH CASTS URNS QL MICRO: ABNORMAL /LPF
ERYTHROCYTE [DISTWIDTH] IN BLOOD BY AUTOMATED COUNT: 13.2 % (ref 11.5–14.5)
GLOBULIN SER CALC-MCNC: 4.3 G/DL (ref 2–4)
GLUCOSE SERPL-MCNC: 120 MG/DL (ref 65–100)
GLUCOSE UR STRIP.AUTO-MCNC: NEGATIVE MG/DL
HCT VFR BLD AUTO: 43 % (ref 35–47)
HGB BLD-MCNC: 14.4 G/DL (ref 11.5–16)
HGB UR QL STRIP: NEGATIVE
IMM GRANULOCYTES # BLD AUTO: 0.1 K/UL (ref 0–0.04)
IMM GRANULOCYTES NFR BLD AUTO: 1 % (ref 0–0.5)
KETONES UR QL STRIP.AUTO: NEGATIVE MG/DL
LEUKOCYTE ESTERASE UR QL STRIP.AUTO: NEGATIVE
LYMPHOCYTES # BLD: 4.5 K/UL (ref 0.8–3.5)
LYMPHOCYTES NFR BLD: 27 % (ref 12–49)
MAGNESIUM SERPL-MCNC: 2 MG/DL (ref 1.6–2.4)
MCH RBC QN AUTO: 33.4 PG (ref 26–34)
MCHC RBC AUTO-ENTMCNC: 33.5 G/DL (ref 30–36.5)
MCV RBC AUTO: 99.8 FL (ref 80–99)
MONOCYTES # BLD: 0.9 K/UL (ref 0–1)
MONOCYTES NFR BLD: 5 % (ref 5–13)
NEUTS SEG # BLD: 11.1 K/UL (ref 1.8–8)
NEUTS SEG NFR BLD: 67 % (ref 32–75)
NITRITE UR QL STRIP.AUTO: NEGATIVE
NRBC # BLD: 0 K/UL (ref 0–0.01)
NRBC BLD-RTO: 0 PER 100 WBC
PH UR STRIP: 5 [PH] (ref 5–8)
PHOSPHATE SERPL-MCNC: 3 MG/DL (ref 2.6–4.7)
PLATELET # BLD AUTO: 252 K/UL (ref 150–400)
PMV BLD AUTO: 11.5 FL (ref 8.9–12.9)
POTASSIUM SERPL-SCNC: 2.7 MMOL/L (ref 3.5–5.1)
PROT SERPL-MCNC: 8.3 G/DL (ref 6.4–8.2)
PROT UR STRIP-MCNC: NEGATIVE MG/DL
RBC # BLD AUTO: 4.31 M/UL (ref 3.8–5.2)
RBC #/AREA URNS HPF: ABNORMAL /HPF (ref 0–5)
SAMPLES BEING HELD,HOLD: NORMAL
SODIUM SERPL-SCNC: 140 MMOL/L (ref 136–145)
SP GR UR REFRACTOMETRY: 1.03 (ref 1–1.03)
UROBILINOGEN UR QL STRIP.AUTO: 0.2 EU/DL (ref 0.2–1)
WBC # BLD AUTO: 16.6 K/UL (ref 3.6–11)
WBC URNS QL MICRO: ABNORMAL /HPF (ref 0–4)

## 2021-08-12 PROCEDURE — 74011250636 HC RX REV CODE- 250/636: Performed by: INTERNAL MEDICINE

## 2021-08-12 PROCEDURE — 74011250636 HC RX REV CODE- 250/636: Performed by: EMERGENCY MEDICINE

## 2021-08-12 PROCEDURE — 99218 HC RM OBSERVATION: CPT

## 2021-08-12 PROCEDURE — 74011636320 HC RX REV CODE- 636/320

## 2021-08-12 PROCEDURE — 83735 ASSAY OF MAGNESIUM: CPT

## 2021-08-12 PROCEDURE — 95816 EEG AWAKE AND DROWSY: CPT | Performed by: PSYCHIATRY & NEUROLOGY

## 2021-08-12 PROCEDURE — 65660000000 HC RM CCU STEPDOWN

## 2021-08-12 PROCEDURE — 82550 ASSAY OF CK (CPK): CPT

## 2021-08-12 PROCEDURE — 84100 ASSAY OF PHOSPHORUS: CPT

## 2021-08-12 PROCEDURE — 99285 EMERGENCY DEPT VISIT HI MDM: CPT

## 2021-08-12 PROCEDURE — 96374 THER/PROPH/DIAG INJ IV PUSH: CPT

## 2021-08-12 PROCEDURE — 96375 TX/PRO/DX INJ NEW DRUG ADDON: CPT

## 2021-08-12 PROCEDURE — 81003 URINALYSIS AUTO W/O SCOPE: CPT

## 2021-08-12 PROCEDURE — 85025 COMPLETE CBC W/AUTO DIFF WBC: CPT

## 2021-08-12 PROCEDURE — 93005 ELECTROCARDIOGRAM TRACING: CPT

## 2021-08-12 PROCEDURE — 70450 CT HEAD/BRAIN W/O DYE: CPT

## 2021-08-12 PROCEDURE — 71045 X-RAY EXAM CHEST 1 VIEW: CPT

## 2021-08-12 PROCEDURE — 96366 THER/PROPH/DIAG IV INF ADDON: CPT

## 2021-08-12 PROCEDURE — 99223 1ST HOSP IP/OBS HIGH 75: CPT | Performed by: PSYCHIATRY & NEUROLOGY

## 2021-08-12 PROCEDURE — 74011250637 HC RX REV CODE- 250/637: Performed by: INTERNAL MEDICINE

## 2021-08-12 PROCEDURE — A9576 INJ PROHANCE MULTIPACK: HCPCS

## 2021-08-12 PROCEDURE — 70553 MRI BRAIN STEM W/O & W/DYE: CPT

## 2021-08-12 PROCEDURE — 96365 THER/PROPH/DIAG IV INF INIT: CPT

## 2021-08-12 PROCEDURE — 36415 COLL VENOUS BLD VENIPUNCTURE: CPT

## 2021-08-12 PROCEDURE — 80053 COMPREHEN METABOLIC PANEL: CPT

## 2021-08-12 RX ORDER — AMLODIPINE BESYLATE 5 MG/1
10 TABLET ORAL DAILY
Status: DISCONTINUED | OUTPATIENT
Start: 2021-08-13 | End: 2021-08-13 | Stop reason: HOSPADM

## 2021-08-12 RX ORDER — ONDANSETRON 4 MG/1
4 TABLET, ORALLY DISINTEGRATING ORAL
Status: DISCONTINUED | OUTPATIENT
Start: 2021-08-12 | End: 2021-08-13 | Stop reason: HOSPADM

## 2021-08-12 RX ORDER — SODIUM CHLORIDE 0.9 % (FLUSH) 0.9 %
5-40 SYRINGE (ML) INJECTION EVERY 8 HOURS
Status: DISCONTINUED | OUTPATIENT
Start: 2021-08-12 | End: 2021-08-13 | Stop reason: HOSPADM

## 2021-08-12 RX ORDER — ONDANSETRON 2 MG/ML
4 INJECTION INTRAMUSCULAR; INTRAVENOUS
Status: DISCONTINUED | OUTPATIENT
Start: 2021-08-12 | End: 2021-08-13 | Stop reason: HOSPADM

## 2021-08-12 RX ORDER — POTASSIUM CHLORIDE 14.9 MG/ML
10 INJECTION INTRAVENOUS
Status: COMPLETED | OUTPATIENT
Start: 2021-08-12 | End: 2021-08-12

## 2021-08-12 RX ORDER — SODIUM CHLORIDE 0.9 % (FLUSH) 0.9 %
5-40 SYRINGE (ML) INJECTION AS NEEDED
Status: DISCONTINUED | OUTPATIENT
Start: 2021-08-12 | End: 2021-08-13 | Stop reason: HOSPADM

## 2021-08-12 RX ORDER — POLYETHYLENE GLYCOL 3350 17 G/17G
17 POWDER, FOR SOLUTION ORAL DAILY PRN
Status: DISCONTINUED | OUTPATIENT
Start: 2021-08-12 | End: 2021-08-13 | Stop reason: HOSPADM

## 2021-08-12 RX ORDER — ACETAMINOPHEN 650 MG/1
650 SUPPOSITORY RECTAL
Status: DISCONTINUED | OUTPATIENT
Start: 2021-08-12 | End: 2021-08-13 | Stop reason: HOSPADM

## 2021-08-12 RX ORDER — POTASSIUM CHLORIDE 750 MG/1
40 TABLET, FILM COATED, EXTENDED RELEASE ORAL
Status: COMPLETED | OUTPATIENT
Start: 2021-08-12 | End: 2021-08-12

## 2021-08-12 RX ORDER — OXYCODONE AND ACETAMINOPHEN 5; 325 MG/1; MG/1
1 TABLET ORAL
Status: DISCONTINUED | OUTPATIENT
Start: 2021-08-12 | End: 2021-08-13 | Stop reason: HOSPADM

## 2021-08-12 RX ORDER — ENOXAPARIN SODIUM 100 MG/ML
40 INJECTION SUBCUTANEOUS DAILY
Status: DISCONTINUED | OUTPATIENT
Start: 2021-08-13 | End: 2021-08-13 | Stop reason: HOSPADM

## 2021-08-12 RX ORDER — POTASSIUM CHLORIDE 750 MG/1
20 TABLET, FILM COATED, EXTENDED RELEASE ORAL DAILY
Status: DISCONTINUED | OUTPATIENT
Start: 2021-08-13 | End: 2021-08-13 | Stop reason: HOSPADM

## 2021-08-12 RX ORDER — HYDROCHLOROTHIAZIDE 25 MG/1
25 TABLET ORAL DAILY
Status: DISCONTINUED | OUTPATIENT
Start: 2021-08-13 | End: 2021-08-13 | Stop reason: HOSPADM

## 2021-08-12 RX ORDER — LORAZEPAM 2 MG/ML
0.5 INJECTION INTRAMUSCULAR
Status: COMPLETED | OUTPATIENT
Start: 2021-08-12 | End: 2021-08-12

## 2021-08-12 RX ORDER — ACETAMINOPHEN 325 MG/1
650 TABLET ORAL
Status: DISCONTINUED | OUTPATIENT
Start: 2021-08-12 | End: 2021-08-13 | Stop reason: HOSPADM

## 2021-08-12 RX ORDER — SODIUM CHLORIDE 9 MG/ML
75 INJECTION, SOLUTION INTRAVENOUS CONTINUOUS
Status: DISCONTINUED | OUTPATIENT
Start: 2021-08-12 | End: 2021-08-13

## 2021-08-12 RX ADMIN — POTASSIUM CHLORIDE 10 MEQ: 200 INJECTION, SOLUTION INTRAVENOUS at 14:52

## 2021-08-12 RX ADMIN — GADOTERIDOL 15 ML: 279.3 INJECTION, SOLUTION INTRAVENOUS at 20:16

## 2021-08-12 RX ADMIN — OXYCODONE HYDROCHLORIDE AND ACETAMINOPHEN 1 TABLET: 5; 325 TABLET ORAL at 20:48

## 2021-08-12 RX ADMIN — SODIUM CHLORIDE 75 ML/HR: 9 INJECTION, SOLUTION INTRAVENOUS at 15:55

## 2021-08-12 RX ADMIN — Medication 10 ML: at 19:09

## 2021-08-12 RX ADMIN — POTASSIUM CHLORIDE 40 MEQ: 750 TABLET, FILM COATED, EXTENDED RELEASE ORAL at 15:19

## 2021-08-12 RX ADMIN — LORAZEPAM 0.5 MG: 2 INJECTION INTRAMUSCULAR; INTRAVENOUS at 19:10

## 2021-08-12 NOTE — H&P
HISTORY AND PHYSICAL      PCP: Magen Shea MD  History source: Patient and her daughter       CC: Seizure       HPI: A 46year old female patient with PMH of HTN, recent right shoulder surgery 2 days ago presented to ED for evaluation of witnessed seizure. Pt has been in mall, she feels hot and then she can not recall the vents. She was told that she out for 3 min and had generalized tonic - clonic movements. She was mildly confused after the episode. She denied tongue biting or urinary incontinence. She denied similar complaints in the past but daughter at bedside states that pt has been having syncopal episodes since 10 years - always feels hot and lose consciousness for few min, no seizure like movements, no work up done on this. Pt feels ok now. Denied any headache, blurry vision, slurred speech, weakness or numbness. In ED< CT head was normal. Labs showed hypokalemia. hospitalist consulted for admission      PMH/PSH:  Past Medical History:   Diagnosis Date    Hypertension     Hypertensive retinopathy     Ovarian cyst      Past Surgical History:   Procedure Laterality Date    HX  SECTION      HX HYSTERECTOMY      hystrectomy with unilateral oophorectomy    HX TUBAL LIGATION         Home meds:   Prior to Admission medications    Medication Sig Start Date End Date Taking? Authorizing Provider   diphenhydrAMINE (BENADRYL) 25 mg capsule as needed. 21   Provider, Historical   oxyCODONE-acetaminophen (PERCOCET) 5-325 mg per tablet Take 1 Tablet by mouth every four (4) hours as needed. Patient not taking: Reported on 2021   Provider, Historical   amLODIPine (NORVASC) 10 mg tablet TAKE 1 TABLET BY MOUTH ONCE DAILY 21   Jayro Lafleur MD   hydroCHLOROthiazide (HYDRODIURIL) 25 mg tablet Take 1 Tablet by mouth daily.  21   Jayro Lafleur MD   fluticasone propionate (Flonase Allergy Relief) 50 mcg/actuation nasal spray 2 Sprays by Both Nostrils route daily as needed. Provider, Historical   potassium chloride (K-DUR, KLOR-CON) 20 mEq tablet TAKE 1 TABLET BY MOUTH ONCE DAILY 9/10/20   Stan Taylor MD   cetirizine (ZyrTEC) 10 mg tablet Zyrtec 10 mg tablet   Take 1 tablet every day by oral route at bedtime for 90 days. Provider, Historical       Allergies:  No Known Allergies    FH:  Family History   Problem Relation Age of Onset    Diabetes Father     Stroke Father        SH:  Social History     Tobacco Use    Smoking status: Never Smoker    Smokeless tobacco: Never Used   Substance Use Topics    Alcohol use: Yes     Comment: social       ROS: A comprehensive review of systems was negative except for that written in the HPI. PHYSICAL EXAM:  Visit Vitals  /80   Pulse 67   Temp 99 °F (37.2 °C)   Resp 20   SpO2 98%       Gen: NAD  HEENT: anicteric sclerae, normal conjunctiva, oropharynx clear, MM moist  Neck: supple, trachea midline, no adenopathy  Heart: RRR, no MRG, no JVD, no peripheral edema  Lungs: CTA b/l, non-labored respirations  Abd: soft, NT, ND, BS+, no organomegaly  Extr: warm  Skin: dry, no rash  Neuro: CN II-XII grossly intact, normal speech, moves all extremities  Psych: normal mood, appropriate affect    Labs/Imaging:  Recent Results (from the past 24 hour(s))   SAMPLES BEING HELD    Collection Time: 08/12/21 11:38 AM   Result Value Ref Range    SAMPLES BEING HELD 1RED,1BLU     COMMENT        Add-on orders for these samples will be processed based on acceptable specimen integrity and analyte stability, which may vary by analyte.    CBC WITH AUTOMATED DIFF    Collection Time: 08/12/21 11:38 AM   Result Value Ref Range    WBC 16.6 (H) 3.6 - 11.0 K/uL    RBC 4.31 3.80 - 5.20 M/uL    HGB 14.4 11.5 - 16.0 g/dL    HCT 43.0 35.0 - 47.0 %    MCV 99.8 (H) 80.0 - 99.0 FL    MCH 33.4 26.0 - 34.0 PG    MCHC 33.5 30.0 - 36.5 g/dL    RDW 13.2 11.5 - 14.5 %    PLATELET 752 801 - 261 K/uL    MPV 11.5 8.9 - 12.9 FL    NRBC 0.0 0  WBC    ABSOLUTE NRBC 0.00 0.00 - 0.01 K/uL    NEUTROPHILS 67 32 - 75 %    LYMPHOCYTES 27 12 - 49 %    MONOCYTES 5 5 - 13 %    EOSINOPHILS 0 0 - 7 %    BASOPHILS 0 0 - 1 %    IMMATURE GRANULOCYTES 1 (H) 0.0 - 0.5 %    ABS. NEUTROPHILS 11.1 (H) 1.8 - 8.0 K/UL    ABS. LYMPHOCYTES 4.5 (H) 0.8 - 3.5 K/UL    ABS. MONOCYTES 0.9 0.0 - 1.0 K/UL    ABS. EOSINOPHILS 0.0 0.0 - 0.4 K/UL    ABS. BASOPHILS 0.1 0.0 - 0.1 K/UL    ABS. IMM. GRANS. 0.1 (H) 0.00 - 0.04 K/UL    DF AUTOMATED     METABOLIC PANEL, COMPREHENSIVE    Collection Time: 08/12/21 11:38 AM   Result Value Ref Range    Sodium 140 136 - 145 mmol/L    Potassium 2.7 (LL) 3.5 - 5.1 mmol/L    Chloride 107 97 - 108 mmol/L    CO2 28 21 - 32 mmol/L    Anion gap 5 5 - 15 mmol/L    Glucose 120 (H) 65 - 100 mg/dL    BUN 14 6 - 20 MG/DL    Creatinine 0.79 0.55 - 1.02 MG/DL    BUN/Creatinine ratio 18 12 - 20      GFR est AA >60 >60 ml/min/1.73m2    GFR est non-AA >60 >60 ml/min/1.73m2    Calcium 9.3 8.5 - 10.1 MG/DL    Bilirubin, total 0.4 0.2 - 1.0 MG/DL    ALT (SGPT) 22 12 - 78 U/L    AST (SGOT) 11 (L) 15 - 37 U/L    Alk.  phosphatase 54 45 - 117 U/L    Protein, total 8.3 (H) 6.4 - 8.2 g/dL    Albumin 4.0 3.5 - 5.0 g/dL    Globulin 4.3 (H) 2.0 - 4.0 g/dL    A-G Ratio 0.9 (L) 1.1 - 2.2     MAGNESIUM    Collection Time: 08/12/21 11:38 AM   Result Value Ref Range    Magnesium 2.0 1.6 - 2.4 mg/dL   PHOSPHORUS    Collection Time: 08/12/21 11:38 AM   Result Value Ref Range    Phosphorus 3.0 2.6 - 4.7 MG/DL   CK    Collection Time: 08/12/21 11:38 AM   Result Value Ref Range     26 - 192 U/L   EKG, 12 LEAD, INITIAL    Collection Time: 08/12/21 12:04 PM   Result Value Ref Range    Ventricular Rate 71 BPM    Atrial Rate 71 BPM    P-R Interval 170 ms    QRS Duration 78 ms    Q-T Interval 392 ms    QTC Calculation (Bezet) 425 ms    Calculated P Axis 26 degrees    Calculated R Axis 48 degrees    Calculated T Axis 29 degrees    Diagnosis       Normal sinus rhythm  Nonspecific T wave abnormality  Abnormal ECG  When compared with ECG of 10-MAY-2021 14:56,  No significant change was found     URINALYSIS W/ RFLX MICROSCOPIC    Collection Time: 08/12/21  1:08 PM   Result Value Ref Range    Color YELLOW/STRAW      Appearance CLEAR CLEAR      Specific gravity 1.030 1.003 - 1.030      pH (UA) 5.0 5.0 - 8.0      Protein Negative NEG mg/dL    Glucose Negative NEG mg/dL    Ketone Negative NEG mg/dL    Bilirubin Negative NEG      Blood Negative NEG      Urobilinogen 0.2 0.2 - 1.0 EU/dL    Nitrites Negative NEG      Leukocyte Esterase Negative NEG      WBC 0-4 0 - 4 /hpf    RBC 0-5 0 - 5 /hpf    Epithelial cells FEW FEW /lpf    Bacteria Negative NEG /hpf    CA Oxalate crystals 1+ (A) NEG       Recent Labs     08/12/21  1138   WBC 16.6*   HGB 14.4   HCT 43.0        Recent Labs     08/12/21  1138      K 2.7*      CO2 28   BUN 14   CREA 0.79   *   CA 9.3   MG 2.0   PHOS 3.0     Recent Labs     08/12/21  1138   ALT 22   AP 54   TBILI 0.4   TP 8.3*   ALB 4.0   GLOB 4.3*       Recent Labs     08/12/21  1138          No results for input(s): INR, PTP, APTT, INREXT in the last 72 hours. No results for input(s): PH, PCO2, PO2 in the last 72 hours. All labs and imaging personally reviewed by me. Assessment & Plan:   Seizure - witnessed  - admit to neuro  - CT head:  No acute intracranial abnormality. - appreciate neurology input  - MRI brain and EEG ordered    Syncopal episodes  - tele monitoring  - Echo ordered  - may need cardiac monitor as outpt  - PT/OT  - orthostatics     Hypokalemia - replaced, will monitor    Leucocytosis- likely reactive  - no signs of infection  - CXR : clear lungs. UA clean  - will monitor off abx    Recent right shoulder sx  - in sling    HTN - c/w home meds amlodipine, HCTZ    DVT ppx: lovenox  Code status: Full  Disposition: TBD.  Home when ready    Signed By: Gracia Mendoza MD     August 12, 2021

## 2021-08-12 NOTE — PROGRESS NOTES
Preliminary Neurology Consult Note    Ms. Trung Ruiz is a pleasant 46year old RH dominant female who presented with episode of convulsion. By history appears most consistent with convulsive syncope, patient absolutely denies any post-ictal confusion. Furthermore, daughter mentions 10 + year history of syncope (without convulsion) described similarly by feeling warm then passing out can be aborted by getting head down, deep breathing. Suspect vasovagal etiology, will obtain TTE, EEG, MRI for completeness may benefit from brief outpatient telemetry monitoring. Full note to follow.

## 2021-08-12 NOTE — ROUTINE PROCESS
TRANSFER - OUT REPORT:    Verbal report given to Maxine Landry RN(name) on Tyrell Noriega  being transferred to NSTU(unit) for routine progression of care       Report consisted of patients Situation, Background, Assessment and   Recommendations(SBAR). Information from the following report(s) SBAR, Kardex, Intake/Output and MAR was reviewed with the receiving nurse. Lines:   Peripheral IV 08/12/21 Left Antecubital (Active)   Site Assessment Clean, dry, & intact 08/12/21 1142   Phlebitis Assessment 0 08/12/21 1142   Infiltration Assessment 0 08/12/21 1142   Dressing Status Clean, dry, & intact 08/12/21 1142   Dressing Type Transparent 08/12/21 1142   Hub Color/Line Status Blue 08/12/21 1142   Action Taken Blood drawn 08/12/21 1142   Alcohol Cap Used No 08/12/21 1142        Opportunity for questions and clarification was provided.       Patient transported with:   [employer]

## 2021-08-12 NOTE — ED PROVIDER NOTES
This is a 59-year-old female with a history of hypertension who presents several days following surgery on her right shoulder to remove debris. She was out shopping today and had a witnessed generalized seizure. She was postictal following the event according to EMS. She was postictal to them. Upon arrival here she is not postictal.  She states that she was feeling fine the day prior to the onset of the symptoms. She felt a little bit sweaty and that was last she remembered. There have been no symptoms leading up to this event. Her only new medication was oxycodone for her shoulder pain. She is on hydrochlorothiazide and amlodipine for her hypertension. She voices no other complaint.            Past Medical History:   Diagnosis Date    Hypertension     Hypertensive retinopathy     Ovarian cyst        Past Surgical History:   Procedure Laterality Date    HX  SECTION      HX HYSTERECTOMY  2020    hystrectomy with unilateral oophorectomy    HX TUBAL LIGATION           Family History:   Problem Relation Age of Onset    Diabetes Father     Stroke Father        Social History     Socioeconomic History    Marital status: SINGLE     Spouse name: Not on file    Number of children: Not on file    Years of education: Not on file    Highest education level: Not on file   Occupational History    Not on file   Tobacco Use    Smoking status: Never Smoker    Smokeless tobacco: Never Used   Vaping Use    Vaping Use: Never used   Substance and Sexual Activity    Alcohol use: Yes     Comment: social    Drug use: Never    Sexual activity: Not on file   Other Topics Concern    Not on file   Social History Narrative    Not on file     Social Determinants of Health     Financial Resource Strain:     Difficulty of Paying Living Expenses:    Food Insecurity:     Worried About Running Out of Food in the Last Year:     [address] in the Last Year:    Transportation Needs:     Lack of Transportation (Medical):  Lack of Transportation (Non-Medical):    Physical Activity:     Days of Exercise per Week:     Minutes of Exercise per Session:    Stress:     Feeling of Stress :    Social Connections:     Frequency of Communication with Friends and Family:     Frequency of Social Gatherings with Friends and Family:     Attends Christian Services:     Active Member of Clubs or Organizations:     Attends Club or Organization Meetings:     Marital Status:    Intimate Partner Violence:     Fear of Current or Ex-Partner:     Emotionally Abused:     Physically Abused:     Sexually Abused: ALLERGIES: Patient has no known allergies. Review of Systems   Constitutional: Negative for activity change, appetite change, chills, fatigue and fever. Patient became sweaty just prior to this event. HENT: Negative for ear pain, facial swelling, sore throat and trouble swallowing. Eyes: Negative for pain, discharge and visual disturbance. Respiratory: Negative for chest tightness, shortness of breath and wheezing. Cardiovascular: Negative for chest pain and palpitations. Gastrointestinal: Negative for abdominal pain, blood in stool, nausea and vomiting. Genitourinary: Negative for difficulty urinating, flank pain and hematuria. Musculoskeletal: Negative for arthralgias, joint swelling, myalgias and neck pain. Some mild shoulder pain but it is postop. Is not hurting anymore after the seizure than before   Skin: Negative for color change and rash. Neurological: Negative for dizziness, weakness, numbness and headaches. Hematological: Negative for adenopathy. Does not bruise/bleed easily. Psychiatric/Behavioral: Negative for behavioral problems, confusion and sleep disturbance. All other systems reviewed and are negative.       Vitals:    08/12/21 1102   BP: (!) 138/90   Pulse: 67   Resp: 17   Temp: 98.2 °F (36.8 °C)   SpO2: 100%            Physical Exam  Vitals and nursing note reviewed. Constitutional:       General: She is not in acute distress. Appearance: She is well-developed. HENT:      Head: Normocephalic and atraumatic. Nose: Nose normal.   Eyes:      General: No scleral icterus. Conjunctiva/sclera: Conjunctivae normal.      Pupils: Pupils are equal, round, and reactive to light. Neck:      Thyroid: No thyromegaly. Vascular: No JVD. Trachea: No tracheal deviation. Comments: No carotid bruits noted. Cardiovascular:      Rate and Rhythm: Normal rate and regular rhythm. Heart sounds: Normal heart sounds. No murmur heard. No friction rub. No gallop. Pulmonary:      Effort: Pulmonary effort is normal. No respiratory distress. Breath sounds: Normal breath sounds. No wheezing or rales. Chest:      Chest wall: No tenderness. Abdominal:      General: Bowel sounds are normal. There is no distension. Palpations: Abdomen is soft. There is no mass. Tenderness: There is no abdominal tenderness. There is no guarding or rebound. Musculoskeletal:         General: No tenderness. Normal range of motion. Cervical back: Normal range of motion and neck supple. Comments: Patient has a surgical dressing over her right shoulder. Her vascular function and movement is normal distally. Lymphadenopathy:      Cervical: No cervical adenopathy. Skin:     General: Skin is warm and dry. Findings: No erythema or rash. Neurological:      Mental Status: She is alert and oriented to person, place, and time. Cranial Nerves: No cranial nerve deficit. Coordination: Coordination normal.      Deep Tendon Reflexes: Reflexes are normal and symmetric. Psychiatric:         Behavior: Behavior normal.         Thought Content:  Thought content normal.         Judgment: Judgment normal.          MDM  Number of Diagnoses or Management Options  Hypokalemia: new and requires workup  Seizure [facility]): new and requires workup     Amount and/or Complexity of Data Reviewed  Clinical lab tests: ordered and reviewed  Tests in the radiology section of CPT®: ordered and reviewed  Decide to obtain previous medical records or to obtain history from someone other than the patient: yes  Review and summarize past medical records: yes  Discuss the patient with other providers: yes  Independent visualization of images, tracings, or specimens: yes    Risk of Complications, Morbidity, and/or Mortality  Presenting problems: high  Diagnostic procedures: high  Management options: high    Patient Progress  Patient progress: stable         Procedures    The patient has not been vaccinated for Covid. This is a first-time seizure for this patient. There was no trauma and no clear explanation for why these events have occurred. Will evaluate labs and CT. CT appears normal.    Patient's potassium is low at 2.7. She has been given potassium. With this new onset she seizure, will consult the hospitalist for possible admission and further evaluation. Perfect Serve Consult for Admission  2:25 PM    ED Room Number: ER22/22  Patient Name and age:  Cholo Almanzar 46 y.o.  female  Working Diagnosis:   1. Seizure ([address]    2.  Hypokalemia        COVID-19 Suspicion:  no  Sepsis present:  no  Reassessment needed: no  Code Status:  Full Code  Readmission: no  Isolation Requirements:  no  Recommended Level of Care:  telemetry  Department:[facility] - 21   Other:

## 2021-08-13 ENCOUNTER — APPOINTMENT (OUTPATIENT)
Dept: NON INVASIVE DIAGNOSTICS | Age: 51
End: 2021-08-13
Attending: PSYCHIATRY & NEUROLOGY
Payer: COMMERCIAL

## 2021-08-13 VITALS
RESPIRATION RATE: 17 BRPM | TEMPERATURE: 98.5 F | HEIGHT: 66 IN | HEART RATE: 61 BPM | SYSTOLIC BLOOD PRESSURE: 127 MMHG | WEIGHT: 208 LBS | DIASTOLIC BLOOD PRESSURE: 83 MMHG | OXYGEN SATURATION: 98 % | BODY MASS INDEX: 33.43 KG/M2

## 2021-08-13 LAB
ANION GAP SERPL CALC-SCNC: 3 MMOL/L (ref 5–15)
ATRIAL RATE: 71 BPM
BASOPHILS # BLD: 0.1 K/UL (ref 0–0.1)
BASOPHILS NFR BLD: 1 % (ref 0–1)
BUN SERPL-MCNC: 11 MG/DL (ref 6–20)
BUN/CREAT SERPL: 17 (ref 12–20)
CALCIUM SERPL-MCNC: 8.5 MG/DL (ref 8.5–10.1)
CALCULATED P AXIS, ECG09: 26 DEGREES
CALCULATED R AXIS, ECG10: 48 DEGREES
CALCULATED T AXIS, ECG11: 29 DEGREES
CHLORIDE SERPL-SCNC: 111 MMOL/L (ref 97–108)
CHOLEST SERPL-MCNC: 150 MG/DL
CO2 SERPL-SCNC: 26 MMOL/L (ref 21–32)
CREAT SERPL-MCNC: 0.65 MG/DL (ref 0.55–1.02)
DIAGNOSIS, 93000: NORMAL
DIFFERENTIAL METHOD BLD: ABNORMAL
EOSINOPHIL # BLD: 0.1 K/UL (ref 0–0.4)
EOSINOPHIL NFR BLD: 1 % (ref 0–7)
ERYTHROCYTE [DISTWIDTH] IN BLOOD BY AUTOMATED COUNT: 13.5 % (ref 11.5–14.5)
EST. AVERAGE GLUCOSE BLD GHB EST-MCNC: 105 MG/DL
GLUCOSE SERPL-MCNC: 103 MG/DL (ref 65–100)
HBA1C MFR BLD: 5.3 % (ref 4–5.6)
HCT VFR BLD AUTO: 36.7 % (ref 35–47)
HDLC SERPL-MCNC: 28 MG/DL
HDLC SERPL: 5.4 {RATIO} (ref 0–5)
HGB BLD-MCNC: 12.4 G/DL (ref 11.5–16)
IMM GRANULOCYTES # BLD AUTO: 0 K/UL (ref 0–0.04)
IMM GRANULOCYTES NFR BLD AUTO: 0 % (ref 0–0.5)
LDLC SERPL CALC-MCNC: 66.6 MG/DL (ref 0–100)
LYMPHOCYTES # BLD: 3.5 K/UL (ref 0.8–3.5)
LYMPHOCYTES NFR BLD: 36 % (ref 12–49)
MCH RBC QN AUTO: 33.2 PG (ref 26–34)
MCHC RBC AUTO-ENTMCNC: 33.8 G/DL (ref 30–36.5)
MCV RBC AUTO: 98.4 FL (ref 80–99)
MONOCYTES # BLD: 0.6 K/UL (ref 0–1)
MONOCYTES NFR BLD: 6 % (ref 5–13)
NEUTS SEG # BLD: 5.5 K/UL (ref 1.8–8)
NEUTS SEG NFR BLD: 56 % (ref 32–75)
NRBC # BLD: 0 K/UL (ref 0–0.01)
NRBC BLD-RTO: 0 PER 100 WBC
P-R INTERVAL, ECG05: 170 MS
PLATELET # BLD AUTO: 223 K/UL (ref 150–400)
PMV BLD AUTO: 11.4 FL (ref 8.9–12.9)
POTASSIUM SERPL-SCNC: 3.5 MMOL/L (ref 3.5–5.1)
Q-T INTERVAL, ECG07: 392 MS
QRS DURATION, ECG06: 78 MS
QTC CALCULATION (BEZET), ECG08: 425 MS
RBC # BLD AUTO: 3.73 M/UL (ref 3.8–5.2)
SODIUM SERPL-SCNC: 140 MMOL/L (ref 136–145)
TRIGL SERPL-MCNC: 277 MG/DL (ref ?–150)
VENTRICULAR RATE, ECG03: 71 BPM
VLDLC SERPL CALC-MCNC: 55.4 MG/DL
WBC # BLD AUTO: 9.7 K/UL (ref 3.6–11)

## 2021-08-13 PROCEDURE — 97161 PT EVAL LOW COMPLEX 20 MIN: CPT

## 2021-08-13 PROCEDURE — 97116 GAIT TRAINING THERAPY: CPT

## 2021-08-13 PROCEDURE — 83036 HEMOGLOBIN GLYCOSYLATED A1C: CPT

## 2021-08-13 PROCEDURE — 97535 SELF CARE MNGMENT TRAINING: CPT

## 2021-08-13 PROCEDURE — 36415 COLL VENOUS BLD VENIPUNCTURE: CPT

## 2021-08-13 PROCEDURE — 80061 LIPID PANEL: CPT

## 2021-08-13 PROCEDURE — 96372 THER/PROPH/DIAG INJ SC/IM: CPT

## 2021-08-13 PROCEDURE — 74011250636 HC RX REV CODE- 250/636: Performed by: INTERNAL MEDICINE

## 2021-08-13 PROCEDURE — 93306 TTE W/DOPPLER COMPLETE: CPT

## 2021-08-13 PROCEDURE — 85025 COMPLETE CBC W/AUTO DIFF WBC: CPT

## 2021-08-13 PROCEDURE — 99231 SBSQ HOSP IP/OBS SF/LOW 25: CPT | Performed by: PSYCHIATRY & NEUROLOGY

## 2021-08-13 PROCEDURE — 74011250637 HC RX REV CODE- 250/637: Performed by: INTERNAL MEDICINE

## 2021-08-13 PROCEDURE — 80048 BASIC METABOLIC PNL TOTAL CA: CPT

## 2021-08-13 PROCEDURE — 99218 HC RM OBSERVATION: CPT

## 2021-08-13 PROCEDURE — 97165 OT EVAL LOW COMPLEX 30 MIN: CPT

## 2021-08-13 RX ADMIN — HYDROCHLOROTHIAZIDE 25 MG: 25 TABLET ORAL at 09:37

## 2021-08-13 RX ADMIN — OXYCODONE HYDROCHLORIDE AND ACETAMINOPHEN 1 TABLET: 5; 325 TABLET ORAL at 13:44

## 2021-08-13 RX ADMIN — OXYCODONE HYDROCHLORIDE AND ACETAMINOPHEN 1 TABLET: 5; 325 TABLET ORAL at 01:40

## 2021-08-13 RX ADMIN — AMLODIPINE BESYLATE 10 MG: 5 TABLET ORAL at 09:41

## 2021-08-13 RX ADMIN — ACETAMINOPHEN 650 MG: 325 TABLET ORAL at 06:34

## 2021-08-13 RX ADMIN — POTASSIUM CHLORIDE 20 MEQ: 750 TABLET, FILM COATED, EXTENDED RELEASE ORAL at 09:42

## 2021-08-13 RX ADMIN — ENOXAPARIN SODIUM 40 MG: 40 INJECTION SUBCUTANEOUS at 09:37

## 2021-08-13 NOTE — PROGRESS NOTES
Problem: Pain  Goal: *Control of Pain  Outcome: Progressing Towards Goal  Goal: *PALLIATIVE CARE:  Alleviation of Pain  Outcome: Progressing Towards Goal     Problem: Patient Education: Go to Patient Education Activity  Goal: Patient/Family Education  Outcome: Progressing Towards Goal     Problem: Falls - Risk of  Goal: *Absence of Falls  Description: Document Florin Sites Fall Risk and appropriate interventions in the flowsheet.   Outcome: Progressing Towards Goal  Note: Fall Risk Interventions:

## 2021-08-13 NOTE — DISCHARGE SUMMARY
Discharge Summary     Patient:  Domingo Shi       MRN: [id number]       YOB: 1970       Age: 46 y.o. Date of admission:  8/12/2021    Date of discharge:  8/13/2021    Primary care provider: Dr. Brandyn Young MD    Admitting provider:  Garrison España MD    Discharging provider:  Jefferson Moore [address] [phone]. If unavailable, call [phone] and ask the  to page the triage hospitalist.    Consultations  · IP CONSULT TO NEUROLOGY    Procedures  · * No surgery found *    Discharge destination: home . The patient is stable for discharge. Admission diagnosis  · Seizure ([facility] [address] [R56.9]    Current Discharge Medication List      CONTINUE these medications which have NOT CHANGED    Details   diphenhydrAMINE (BENADRYL) 25 mg capsule as needed. oxyCODONE-acetaminophen (PERCOCET) 5-325 mg per tablet Take 1 Tablet by mouth every four (4) hours as needed. amLODIPine (NORVASC) 10 mg tablet TAKE 1 TABLET BY MOUTH ONCE DAILY  Qty: 90 Tablet, Refills: 3    Associated Diagnoses: Essential hypertension      hydroCHLOROthiazide (HYDRODIURIL) 25 mg tablet Take 1 Tablet by mouth daily. Qty: 90 Tablet, Refills: 3    Associated Diagnoses: Essential hypertension      fluticasone propionate (Flonase Allergy Relief) 50 mcg/actuation nasal spray 2 Sprays by Both Nostrils route daily as needed. potassium chloride (K-DUR, KLOR-CON) 20 mEq tablet TAKE 1 TABLET BY MOUTH ONCE DAILY  Qty: 60 Tab, Refills: 1      cetirizine (ZyrTEC) 10 mg tablet Zyrtec 10 mg tablet   Take 1 tablet every day by oral route at bedtime for 90 days.              Follow-up Information     Follow up With Specialties Details Why Tristan Edgar MD Cardiology On 9/21/2021 10:40 am [address]   [address] [facility] [address]  [phone]      Brandyn Young MD  In 1 week  [address] [facility] [place] [address]  [phone]      Adelia Curry MD Neurology  As needed [address] [place] [address]  [phone]            Final discharge diagnoses and brief hospital course  A 46year old female patient with PMH of HTN, recent right shoulder surgery 2 days ago presented to ED for evaluation of witnessed seizure. Pt has been in mall, she feels hot and then she can not recall the vents. She was told that she out for 3 min and had generalized tonic - clonic movements. She was mildly confused after the episode. She denied tongue biting or urinary incontinence. She denied similar complaints in the past but daughter at bedside states that pt has been having syncopal episodes since 10 years - always feels hot and lose consciousness for few min, no seizure like movements, no work up done on this. Pt feels ok now. Denied any headache, blurry vision, slurred speech, weakness or numbness. In ED, CT head was normal. Labs showed hypokalemia. hospitalist consulted for admission     Seizure like episode   - CT head:  No acute intracranial abnormality. - appreciate neurology input  - normal CK  - MRI brain: No acute intracranial abnormality. Left cerebellar encephalomalacia  - EEG unremarkable  - discussed with neurology Dr. Salome Trivedi \" Doubt that chronic MRI findings are related to syncope. Patient has had these episodes chronically without injury or accident is aware of premonitory symptoms would not restrict driving at this time\" . No recommendations for antiepileptics      Syncopal episodes ?   - tele monitoring  - Echo: normal EF 60 - 65%  - cardiac monitor as outpt  - orthostatics negative      Hypokalemia - resolved      Leucocytosis- resolved. likely reactive  - no signs of infection  - CXR : clear lungs.  UA clean  - will monitor off abx     Recent right shoulder sx  - in sling     HTN - c/w home meds amlodipine, HCTZ    Discharge recommendations:  PCP f/u in 1 week  Neurology as needed  Cardiology f/u in 6 weeks with cardiac monitor report    Discharge plan discussed with patient. She understood and agreed    Physical examination at discharge  Visit Vitals  /83   Pulse 61   Temp 98.5 °F (36.9 °C)   Resp 17   Ht 5' 6\" (1.676 m)   Wt 94.3 kg (208 lb)   SpO2 98%   BMI 33.57 kg/m²     Gen: NAD  HEENT: anicteric sclerae, normal conjunctiva, oropharynx clear, MM moist  Neck: supple, trachea midline, no adenopathy  Heart: RRR, no MRG, no JVD, no peripheral edema  Lungs: CTA b/l, non-labored respirations  Abd: soft, NT, ND, BS+, no organomegaly  Extr: warm  Skin: dry, no rash  Neuro: CN II-XII grossly intact, normal speech, moves all extremities  Psych: normal mood, appropriate affect    Pertinent imaging studies:    Per EMR     Recent Labs     08/13/21  0149 08/12/21  1138   WBC 9.7 16.6*   HGB 12.4 14.4   HCT 36.7 43.0    252     Recent Labs     08/13/21  0149 08/12/21  1138    140   K 3.5 2.7*   * 107   CO2 26 28   BUN 11 14   CREA 0.65 0.79   * 120*   CA 8.5 9.3   MG  --  2.0   PHOS  --  3.0     Recent Labs     08/12/21  1138   AP 54   TP 8.3*   ALB 4.0   GLOB 4.3*     No results for input(s): INR, PTP, APTT, INREXT in the last 72 hours. No results for input(s): FE, TIBC, PSAT, FERR in the last 72 hours. No results for input(s): PH, PCO2, PO2 in the last 72 hours.   Recent Labs     08/12/21  1138        No components found for: [place]    Chronic Diagnoses:    Problem List as of 8/13/2021 Date Reviewed: 2/17/2021        Codes Class Noted - Resolved    Seizure ([facility] [address] ICD-10-CM: R56.9  ICD-9-CM: 780.39  8/12/2021 - Present        Obesity (BMI 30-39.9) (Chronic) ICD-10-CM: E66.9  ICD-9-CM: 278.00  7/9/2021 - Present        Adhesive capsulitis of right shoulder ICD-10-CM: M75.01  ICD-9-CM: 726.0  4/23/2021 - Present    Overview Signed 7/9/2021  9:49 AM by Ganseh Linares MD     Formatting of this note might be different from the original.  Added automatically from request for surgery [id number]             Loose body of right shoulder ICD-10-CM: M24.011  ICD-9-CM: 718.11  4/23/2021 - Present    Overview Signed 7/9/2021  9:49 AM by Terrie Coleman MD     Formatting of this note might be different from the original.  Added automatically from request for surgery [id number]             Hypertensive retinopathy ICD-10-CM: H35.039  ICD-9-CM: 362.11  1/28/2019 - Present        Dry eyes ICD-10-CM: I68.099  ICD-9-CM: 375.15  1/28/2019 - Present        Pulmonic valve regurgitation ICD-10-CM: I37.1  ICD-9-CM: 424.3  1/24/2019 - Present        Tricuspid valve regurgitation ICD-10-CM: I07.1  ICD-9-CM: 397.0  1/17/2019 - Present        Mitral valve regurgitation ICD-10-CM: I34.0  ICD-9-CM: 424.0  1/17/2019 - Present        Seasonal allergies ICD-10-CM: J30.2  ICD-9-CM: 477.9  1/7/2019 - Present        Essential hypertension ICD-10-CM: I10  ICD-9-CM: 401.9  1/7/2015 - Present              Time spent on discharge related activities today greater than 30 minutes.       Signed:  Yunior Alvarez MD                 Hospitalist, Internal Medicine      Cc: Mattie Patiño MD

## 2021-08-13 NOTE — PROGRESS NOTES
Neurology Progress Note    Patient ID:  Ayaka Valladares  [id number]  6 y.o.  1970    Subjective:      No symptom events noted in the past 24 hours. Patient appears comfortable and eager to go home. Current Facility-Administered Medications   Medication Dose Route Frequency    sodium chloride (NS) flush 5-40 mL  5-40 mL IntraVENous Q8H    sodium chloride (NS) flush 5-40 mL  5-40 mL IntraVENous PRN    acetaminophen (TYLENOL) tablet 650 mg  650 mg Oral Q6H PRN    Or    acetaminophen (TYLENOL) suppository 650 mg  650 mg Rectal Q6H PRN    polyethylene glycol (MIRALAX) packet 17 g  17 g Oral DAILY PRN    ondansetron (ZOFRAN ODT) tablet 4 mg  4 mg Oral Q8H PRN    Or    ondansetron (ZOFRAN) injection 4 mg  4 mg IntraVENous Q6H PRN    enoxaparin (LOVENOX) injection 40 mg  40 mg SubCUTAneous DAILY    amLODIPine (NORVASC) tablet 10 mg  10 mg Oral DAILY    hydroCHLOROthiazide (HYDRODIURIL) tablet 25 mg  25 mg Oral DAILY    oxyCODONE-acetaminophen (PERCOCET) 5-325 mg per tablet 1 Tablet  1 Tablet Oral Q4H PRN    potassium chloride SR (KLOR-CON 10) tablet 20 mEq  20 mEq Oral DAILY          Objective:     Patient Vitals for the past 8 hrs:   BP Temp Pulse Resp SpO2 Height Weight   08/13/21 1415 127/83 98.5 °F (36.9 °C) 61 17 98 %     08/13/21 1400   74       08/13/21 1200   (!) 59       08/13/21 1011 (!) 138/92         08/13/21 1007 (!) 143/91         08/13/21 1000 126/85  74       08/13/21 0959 128/84         08/13/21 0948 131/82 97.9 °F (36.6 °C) 70 18      08/13/21 0941 131/82  70       08/13/21 0937 131/82  82       08/13/21 0924 123/84  76       08/13/21 0855 119/86     5' 6\" (1.676 m) 208 lb (94.3 kg)   08/13/21 0735      5' 6\" (1.676 m)        No intake/output data recorded.   08/11 1901 - 08/13 0700  In: 243.8 [I.V.:243.8]  Out: -     Lab Review   Recent Results (from the past 24 hour(s))   METABOLIC PANEL, BASIC    Collection Time: 08/13/21  1:49 AM   Result Value Ref Range    Sodium 140 136 - 145 mmol/L    Potassium 3.5 3.5 - 5.1 mmol/L    Chloride 111 (H) 97 - 108 mmol/L    CO2 26 21 - 32 mmol/L    Anion gap 3 (L) 5 - 15 mmol/L    Glucose 103 (H) 65 - 100 mg/dL    BUN 11 6 - 20 MG/DL    Creatinine 0.65 0.55 - 1.02 MG/DL    BUN/Creatinine ratio 17 12 - 20      GFR est AA >60 >60 ml/min/1.73m2    GFR est non-AA >60 >60 ml/min/1.73m2    Calcium 8.5 8.5 - 10.1 MG/DL   CBC WITH AUTOMATED DIFF    Collection Time: 08/13/21  1:49 AM   Result Value Ref Range    WBC 9.7 3.6 - 11.0 K/uL    RBC 3.73 (L) 3.80 - 5.20 M/uL    HGB 12.4 11.5 - 16.0 g/dL    HCT 36.7 35.0 - 47.0 %    MCV 98.4 80.0 - 99.0 FL    MCH 33.2 26.0 - 34.0 PG    MCHC 33.8 30.0 - 36.5 g/dL    RDW 13.5 11.5 - 14.5 %    PLATELET 544 639 - 173 K/uL    MPV 11.4 8.9 - 12.9 FL    NRBC 0.0 0  WBC    ABSOLUTE NRBC 0.00 0.00 - 0.01 K/uL    NEUTROPHILS 56 32 - 75 %    LYMPHOCYTES 36 12 - 49 %    MONOCYTES 6 5 - 13 %    EOSINOPHILS 1 0 - 7 %    BASOPHILS 1 0 - 1 %    IMMATURE GRANULOCYTES 0 0.0 - 0.5 %    ABS. NEUTROPHILS 5.5 1.8 - 8.0 K/UL    ABS. LYMPHOCYTES 3.5 0.8 - 3.5 K/UL    ABS. MONOCYTES 0.6 0.0 - 1.0 K/UL    ABS. EOSINOPHILS 0.1 0.0 - 0.4 K/UL    ABS. BASOPHILS 0.1 0.0 - 0.1 K/UL    ABS. IMM. GRANS. 0.0 0.00 - 0.04 K/UL    DF AUTOMATED       Is a female resting comfortably in bed in some distress from right shoulder. HEENT appears grossly unremarkable to observation neck appears supple. Cardiopulmonary exams not performed directly but patient is resting comfortably. Abdomen is nondistended. Extremities appear warm/dry right arm is in a sling. Neurologically, patient appears alert and oriented attention appears intact. Speech is clear, language fluent. Cranials 2 through 12 appear grossly unremarkable. Motorically patient moves left arm and legs freely restricted range of motion right arm due to recent shoulder surgery.     Assessment:     Kristin Ruiz is a 43-year-old right-hand-dominant female admitted for convulsive syncope in the setting of 10-year plus history of recurrent syncope    Plan:   Syncope:   Suspect diagnosis is ultimately vasovagal in nature, EEG MRI and echocardiogram are unremarkable  Doubt that chronic MRI findings are related to syncope patient never recalls having had a history of stroke doubtful also that this injury would have been silent as such possible that it relates to a prenatal injury as patient denies having any brain imaging previously  At this juncture would appear reasonable to discharge patient can refer to neurology clinic for follow-up which point outpatient cardiac monitoring can be arranged  Patient has had these episodes chronically without injury or accident is aware of premonitory symptoms would not restrict driving at this time      Signed:  Albert Gray MD  8/13/2021  3:05 PM

## 2021-08-13 NOTE — CONSULTS
NEUROLOGY   INPATIENT EVALUATION/CONSULTATION       PATIENT NAME: Elsi Guy    MRN: [id number]    REASON FOR CONSULTATION: Convulsive syncope in the setting of recurrent episodes of syncope for 10+ years    08/13/21      HISTORY OF PRESENT ILLNESS:  Elsi Guy is a 46 y.o. pleasant right-hand-dominant female who presented to [facility] with episode of convulsive syncope. Patient underwent shoulder surgery for loose bone fragments approximately 3 days prior and had been doing well was taking narcotic painkillers regularly. Yesterday just before going to the store she took her first dose of narcotic painkiller and was in the store when she suddenly felt warm as if she might pass out. Bystanders put her in a chair she is reported to have some sort of convulsive activity though she did not fall out of the chair. She endorses entire recollection of where she was when coming to and denies any confusion with EMS reporting postictal confusion. There apparently was some sort of nurse in the store at the time who said that she definitely had a seizure. There is no tongue injury no bladder or bowel incontinence. She was then brought to the hospital where labs were overall unremarkable other than elevated white count. During her conversation her daughter endorses a 10-year history of intermittent episodes of syncope often with the same premonitory symptoms of feeling warm followed by loss of consciousness. This is the first time there has been any convulsive activity however. Denies any history of seizures in herself or family has never really been worked up for her syncopal episodes.   Does state that on one occasion when she will get her head down she was able to abort lost consciousness, that the episodes never happen while laying down and that she denies chest pressure unexplained dizziness vertigo etc.    PAST MEDICAL HISTORY:  Past Medical History:   Diagnosis Date    Hypertension  Hypertensive retinopathy     Ovarian cyst        PAST SURGICAL HISTORY:  Past Surgical History:   Procedure Laterality Date    HX  SECTION      HX HYSTERECTOMY      hystrectomy with unilateral oophorectomy    HX TUBAL LIGATION         FAMILY HISTORY:   Family History   Problem Relation Age of Onset    Diabetes Father     Stroke Father          SOCIAL HISTORY:  Social History     Socioeconomic History    Marital status: SINGLE     Spouse name: Not on file    Number of children: Not on file    Years of education: Not on file    Highest education level: Not on file   Tobacco Use    Smoking status: Never Smoker    Smokeless tobacco: Never Used   Vaping Use    Vaping Use: Never used   Substance and Sexual Activity    Alcohol use: Yes     Comment: social    Drug use: Never     Social Determinants of Health     Financial Resource Strain:     Difficulty of Paying Living Expenses:    Food Insecurity:     Worried About Running Out of Food in the Last Year:     Ran Out of Food in the Last Year:    Transportation Needs:     Lack of Transportation (Medical):      Lack of Transportation (Non-Medical):    Physical Activity:     Days of Exercise per Week:     Minutes of Exercise per Session:    Stress:     Feeling of Stress :    Social Connections:     Frequency of Communication with Friends and Family:     Frequency of Social Gatherings with Friends and Family:     Attends Yazidi Services:     Active Member of Clubs or Organizations:     Attends Club or Organization Meetings:     Marital Status:          MEDICATIONS:   Current Facility-Administered Medications   Medication Dose Route Frequency Provider Last Rate Last Admin    sodium chloride (NS) flush 5-40 mL  5-40 mL IntraVENous Q8H Jimmie Sarah MD   10 mL at 21 1909    sodium chloride (NS) flush 5-40 mL  5-40 mL IntraVENous PRN Bola Amaya MD        acetaminophen (TYLENOL) tablet 650 mg  650 mg Oral Q6H PRN Michelle Mcintosh MD   650 mg at 08/13/21 1522    Or    acetaminophen (TYLENOL) suppository 650 mg  650 mg Rectal Q6H PRN Michelle Mcintosh MD        polyethylene glycol (MIRALAX) packet 17 g  17 g Oral DAILY PRN Michelle Mcintosh MD        ondansetron (ZOFRAN ODT) tablet 4 mg  4 mg Oral Q8H PRN Madala, Sushma, MD        Or    ondansetron (ZOFRAN) injection 4 mg  4 mg IntraVENous Q6H PRN Madala, Sushma, MD        enoxaparin (LOVENOX) injection 40 mg  40 mg SubCUTAneous DAILY Madala, Sushma, MD        0.9% sodium chloride infusion  75 mL/hr IntraVENous CONTINUOUS Madala, Sushma, MD 75 mL/hr at 08/12/21 1555 75 mL/hr at 08/12/21 1555    amLODIPine (NORVASC) tablet 10 mg  10 mg Oral DAILY Madala, Sushma, MD        hydroCHLOROthiazide (HYDRODIURIL) tablet 25 mg  25 mg Oral DAILY Madala, Sushma, MD        oxyCODONE-acetaminophen (PERCOCET) 5-325 mg per tablet 1 Tablet  1 Tablet Oral Q4H PRN Michelle Mcintosh MD   1 Tablet at 08/13/21 0140    potassium chloride SR (KLOR-CON 10) tablet 20 mEq  20 mEq Oral DAILY Michelle Mcintosh MD             ALLERGIES:  No Known Allergies      REVIEW OF SYSTEMS:  10 point ROS reviewed with patient and negative except for those listed above. PHYSICAL EXAM:  Vital Signs:   Visit Vitals  /86 (BP 1 Location: Left arm, BP Patient Position: At rest)   Pulse (!) 54   Temp 98.7 °F (37.1 °C)   Resp 16   Ht 5' 6\" (1.676 m) Comment: From documentation on 7/9/21   Wt 208 lb 4.8 oz (94.5 kg)   SpO2 100%   BMI 33.62 kg/m²     Pleasant female resting countable in exam room in no distress. HEENT appears grossly unremarkable neck appears supple. Cardiopulmonary exams are unremarkable abdomen is nondistended. Extremity appear warm/dry. Neurologically, patient appears alert and oriented attention intact. Speech is clear, language fluent.   Cranials 2 through 12 appear grossly unremarkable, motorically patient has normal bulk and tone 5 5 strength exception of right arm which is not examined due to presence of shoulder sling. Sensation appears intact. Coordination is intact. Remainder of examination is deferred. PERTINENT DATA:   WBC 16.6,     CT Results (maximum last 3): Results from [facility] encounter on 08/12/21    CT HEAD WO CONT    Narrative  EXAM:  CT HEAD WO CONT    INDICATION: New onset seizure    COMPARISON: None    TECHNIQUE: Noncontrast head CT. Coronal and sagittal reformats. CT dose  reduction was achieved through use of a standardized protocol tailored for this  examination and automatic exposure control for dose modulation. Adaptive  statistical iterative reconstruction (ASIR) was utilized. FINDINGS: The ventricles and sulci are age-appropriate without hydrocephalus. There is no mass effect or midline shift. There is no intracranial hemorrhage or  extra-axial fluid collection. There is no abnormal parenchymal attenuation. The  gray-white matter differentiation is maintained. The basal cisterns are patent. The osseous structures are intact. There is a small mucus retention cyst versus  polyp in the right maxillary sinus. The remaining paranasal sinuses and mastoid  air cells are clear. Impression  No acute intracranial abnormality. MRI Results (maximum last 3):     ASSESSMENT:      Zoraida Rodrigues is a 55-year-old right-hand-dominant female who presents to [facility] for recurrence of suspected convulsive syncope    RECOMMENDATIONS:  Convulsive syncope:  Given that premonitory symptoms were consistent with patient's chronic occurrence of syncope (feeling warm) as well as absolute absence of postictal period reported by patient despite EMS reports (which are notoriously unreliable) favor convulsive syncope over seizure  As documented in prelim note patient's daughter endorsed a 10+ year history of intermittent syncope MRI will be obtained obtain, EEG as well as echocardiogram  If these are not with significant findings will recommend discharging with plans for outpatient cardiac monitoring  No antiepileptics or change to current medication regimen warranted    We will reevaluate in the morning      Amanda Jensen MD

## 2021-08-13 NOTE — PROGRESS NOTES
Asked by Dr. Marii Krishnamurthy to arrange 30 day event monitor today. 30 day event monitor ordered and contacted med staff services. Unfortunately, there are no event monitors in stock in dept today. There will be none available until Monday so will have our office mail pt an event monitor and have her apply it at home.    Outpatient followup was also requested by Dr. Marii Krishnamurthy so followup is arranged in 6 weeks with Dr. Aurea Whitt. She may call our office for any questions  Future Appointments   Date Time Provider Isela Toure   9/21/2021 10:40 AM MD EZRA Garcias AMB

## 2021-08-13 NOTE — DISCHARGE INSTRUCTIONS
Please bring this form with you to show your primary care provider at your follow-up appointment. Primary care provider:  Dr. Deep Aviles MD    Discharging provider:  Juliana Sanchez MD    You have been admitted to the hospital with the following diagnoses:  · Seizure [facility]) [R56.9]    FOLLOW-UP CARE RECOMMENDATIONS:    APPOINTMENTS:  · Follow-up with primary care provider, Dr. Deep Aviles MD  -  Please call [phone] shortly after discharge to set up an appointment to be seen in  1 week    · Cardiology in 6 weeks   · Neurology as needed     FOLLOW-UP TESTS recommended: cardiac monitor     PENDING TEST RESULTS:  At the time of your discharge the following test results are still pending: none   Please make sure you review these results with your outpatient follow-up provider(s). SYMPTOMS to watch for: chest pain, shortness of breath, fever, chills, nausea, vomiting, diarrhea, change in mentation, falling, weakness, bleeding. DIET/what to eat:  Cardiac Diet    ACTIVITY:  Activity as tolerated    What to do if new or unexpected symptoms occur? If you experience any of the above symptoms (or should other concerns or questions arise after discharge) please call your primary care physician. Return to the emergency room if you cannot get hold of your doctor. · It is very important that you keep your follow-up appointment(s). · Please bring discharge papers, medication list (and/or medication bottles) to your follow-up appointments for review by your outpatient provider(s). · Please check the list of medications and be sure it includes every medication (even non-prescription medications) that your provider wants you to take. · It is important that you take the medication exactly as they are prescribed. · Keep your medication in the bottles provided by the pharmacist and keep a list of the medication names, dosages, and times to be taken in your wallet.    · Do not take other medications without consulting your doctor. · If you have any questions about your medications or other instructions, please talk to your nurse or care provider before you leave the hospital.    I understand that if any problems occur once I am at home I am to contact my physician. These instructions were explained to me and I had the opportunity to ask questions. 30 day event monitor to be sent to your home. Apply as directed. Any questions regarding monitor or if you do not received after 1 week, please contact Dr. Mitch Tavares office.

## 2021-08-13 NOTE — PROCEDURES
ELECTROENCEPHALOGRAM REPORT     Patient Name: Reuben Calderon  : 1970  Age: 46 y.o. Ordering physician: Jesus Chang MD  Date of EEG: 2021  Interpreting physician: Jesus Chang MD      PROCEDURE: Routine awake and drowsy video electroencephalogram (vEEG). CLINICAL INDICATION: The patient is a 46 y.o. female who is being evaluated for recurrent syncope      DESCRIPTION OF THE RECORD:   During wakefulness, there is well-formed posterior dominant alpha rhythm composed of 9 to 10 Hz alpha with preserved anterior posterior gradient. Transition to drowsiness is marked by fragmentation of the waking background progressive increase in diffuse theta. No sleep architecture is appreciated. No epileptiform discharges or electrographic seizures noted. Reactivity is present temperamental stimuli single-lead ECG demonstrates sinus rhythm.     INTERPRETATION:     This is a normal awake and drowsy routine video electroencephalogram.    Laith Barrientos MD

## 2021-08-13 NOTE — PROGRESS NOTES
OCCUPATIONAL THERAPY EVALUATION/DISCHARGE  Patient: Omar Cockayne (54 y.o. female)  Date: 8/13/2021  Primary Diagnosis: Seizure ([facility] [address] [R56.9]       Precautions:   (recent R shoulder sx, patient unaware of precautions)    ASSESSMENT  Based on the objective data described below, the patient presents with return to baseline function s/p admission for seizure. At baseline she is IND, recently Mod I d/t recent R shoulder sx (8/10/21), typically lives alone but plans to stay with her mother upon d/c. She now presents back to her recent Mod I baseline, mobilizing well with no dizziness or \"hot\" feeling (like she had just prior to the seizure occurred). She was unaware of any RUE precautions from her recent sx, reports she was supposed to have OP PT POD #1 but \"couldn't get it together. \" Strongly encouraged contacting her surgeon's office to clarify WB & ROM restrictions, patient acknowledging understanding, reporting no concerns for d/c home with family. Current Level of Function (ADLs/self-care): Mod I-IND for ADLs and mobility    Functional Outcome Measure: The patient scored 100/100 on the Barthel Index outcome measure which is indicative of no impairment in ADLs and mobility. Other factors to consider for discharge: syncopal episodes x 10 years per report? PLAN :  Recommend with staff: Recommend with nursing, ADLs with supervision/setup, OOB to chair 3x/day, and toileting via functional mobility to and from bathroom. Thank you for completing as able in order to maintain patient strength, endurance and independence. Recommendation for discharge: (in order for the patient to meet his/her long term goals)  No skilled occupational therapy/ follow up rehabilitation needs identified at this time.     This discharge recommendation:  Has been made in collaboration with the attending provider and/or case management    IF patient discharges home will need the following DME: none         SUBJECTIVE:   Patient stated They didn't tell me anything after the surgery so I'm not sure.  re: when asked about RUE restrictions    OBJECTIVE DATA SUMMARY:   HISTORY:   Past Medical History:   Diagnosis Date    Hypertension     Hypertensive retinopathy     Ovarian cyst      Past Surgical History:   Procedure Laterality Date    HX  SECTION  12    HX HYSTERECTOMY      hystrectomy with unilateral oophorectomy    HX TUBAL LIGATION         Prior Level of Function/Environment/Context: IND, recent  Expanded or extensive additional review of patient history:   Home Situation  Home Environment: Private residence  # Steps to Enter: 3  Rails to Enter: Yes  Hand Rails : Bilateral  One/Two Story Residence: Two story  # of Interior Steps: 8  Interior Rails: Left (going up)  Living Alone: No  Support Systems: Parent, Family member(s)  Tub or Shower Type: Tub/Shower combination    Hand dominance: Right    EXAMINATION OF PERFORMANCE DEFICITS:  Cognitive/Behavioral Status:  Neurologic State: Alert  Orientation Level: Oriented X4  Cognition: Appropriate decision making; Appropriate for age attention/concentration; Appropriate safety awareness; Follows commands  Perception: Appears intact  Perseveration: No perseveration noted  Safety/Judgement: Awareness of environment; Fall prevention    Skin: Appears intact, R shoulder dressing noted    Edema: None    Hearing:       Vision/Perceptual:    Tracking: Able to track stimulus in all quadrants w/o difficulty                 Diplopia: No    Acuity: Within Defined Limits         Range of Motion:  AROM: Generally decreased, functional (RUE limited by recent shoulder sx, LUE WNL)  PROM: Generally decreased, functional (RUE limited by recent shoulder sx, LUE WNL)                      Strength:  Strength: Generally decreased, functional (RUE limited by recent shoulder sx, LUE WNL)                Coordination:  Coordination: Generally decreased, functional (RUE limited by recent shoulder sx, LOW WNL)  Fine Motor Skills-Upper: Left Intact; Right Intact    Gross Motor Skills-Upper: Left Intact; Right Impaired    Tone & Sensation:  Tone: Normal  Sensation: Intact                      Balance:  Sitting: Intact  Standing: Intact; Without support    Functional Mobility and Transfers for ADLs:  Bed Mobility:  Supine to Sit: Independent  Sit to Supine: Independent  Scooting: Independent    Transfers:  Sit to Stand: Independent  Stand to Sit: Independent  Bathroom Mobility: Independent  Toilet Transfer : Independent  Tub Transfer: Independent    ADL Assessment:  Feeding: Modified independent    Oral Facial Hygiene/Grooming: Modified Independent    Bathing: Modified independent    Upper Body Dressing: Modified independent    Lower Body Dressing: Modified independent    Toileting: Modified independent                ADL Intervention and task modifications:                      Upper Body Dressing Assistance  Pullover Shirt: Compensatory technique training  Front Opened Shirt: Compensatory technique training  Cues: Verbal cues provided;Visual cues provided    Lower Body Dressing Assistance  Dressing Assistance: Modified independent  Pants With Elastic Waist: Modified independent (simulated)  Socks: Modified independent  Leg Crossed Method Used: No  Position Performed: Bending forward method;Seated edge of bed;Standing    Toileting  Toileting Assistance: Modified independent (simulated in bathroom)  Bladder Hygiene: Modified independent  Bowel Hygiene: Modified indpendent  Clothing Management: Modified independent    Cognitive Retraining  Safety/Judgement: Awareness of environment; Fall prevention      Functional Measure:  Barthel Index:    Bathin  Bladder: 10  Bowels: 10  Groomin  Dressing: 10  Feeding: 10  Mobility: 15  Stairs: 10  Toilet Use: 10  Transfer (Bed to Chair and Back): 15  Total: 100/100        The Barthel ADL Index: Guidelines  1.  The index should be used as a record of what a patient does, not as a record of what a patient could do. 2. The main aim is to establish degree of independence from any help, physical or verbal, however minor and for whatever reason. 3. The need for supervision renders the patient not independent. 4. A patient's performance should be established using the best available evidence. Asking the patient, friends/relatives and nurses are the usual sources, but direct observation and common sense are also important. However direct testing is not needed. 5. Usually the patient's performance over the preceding 24-48 hours is important, but occasionally longer periods will be relevant. 6. Middle categories imply that the patient supplies over 50 per cent of the effort. 7. Use of aids to be independent is allowed. Akil Stone., Barthel, D.W. (0161). Functional evaluation: the Barthel Index. [address] [facility] (14)2. Milana Montero fredis CHANDA Wesley, Marilyn Paulino., Remington Luis., Pauline Figures, [address] [facility] (1999). Measuring the change indisability after inpatient rehabilitation; comparison of the responsiveness of the Barthel Index and Functional Bates Measure. Journal of Neurology, Neurosurgery, and Psychiatry, 66(4), 529-735. Alina Isidro, N.J.A, Austin Hooper,  MARIAMA.J.ESTEFANIA, & Calista Heart, M.A. (2004.) Assessment of post-stroke quality of life in cost-effectiveness studies: The usefulness of the Barthel Index and the EuroQoL-5D.  Quality of Life Research, 15, 187-62         Occupational Therapy Evaluation Charge Determination   History Examination Decision-Making   LOW Complexity : Brief history review  LOW Complexity : 1-3 performance deficits relating to physical, cognitive , or psychosocial skils that result in activity limitations and / or participation restrictions  LOW Complexity : No comorbidities that affect functional and no verbal or physical assistance needed to complete eval tasks       Based on the above components, the patient evaluation is determined to be of the following complexity level: LOW   Pain Rating:  None    Activity Tolerance: WNL    After treatment patient left in no apparent distress:    EOB, call bell within reach, RNs in room    COMMUNICATION/EDUCATION:   The patients plan of care was discussed with: Physical therapist and Registered nurse.      Thank you for this referral.  KENNETH Barber, OTR/L  Time Calculation: 21 mins

## 2021-08-13 NOTE — PROGRESS NOTES
Problem: Pain  Goal: *Control of Pain  Outcome: Progressing Towards Goal     Problem: Falls - Risk of  Goal: *Absence of Falls  Description: Document Tita Fall Risk and appropriate interventions in the flowsheet. Outcome: Progressing Towards Goal  Note: Fall Risk Interventions:                                Problem: Pain  Goal: *Control of Pain  Outcome: Progressing Towards Goal     Problem: Pain  Goal: *Control of Pain  Outcome: Progressing Towards Goal     Problem: Falls - Risk of  Goal: *Absence of Falls  Description: Document Tita Fall Risk and appropriate interventions in the flowsheet.   Outcome: Progressing Towards Goal  Note: Fall Risk Interventions:

## 2021-08-13 NOTE — PROGRESS NOTES
Oral and Written notification given to patient and/or caregiver informing them that they are currently an Outpatient receiving care in our facility. Outpatient services include Observation Services. HEMAL Posadas.

## 2021-08-13 NOTE — PROGRESS NOTES
PHYSICAL THERAPY EVALUATION/DISCHARGE  Patient: Damon Wong (54 y.o. female)  Date: 2021  Primary Diagnosis: Seizure ([address] [R56.9]       Precautions: Fall   (recent R shoulder sx, patient unaware of precautions)      ASSESSMENT  Based on the objective data described below, the patient presents with baseline strength, ROM, and balance except for impaired right shoulder following admission for a seizure. Patient had right shoulder surgery 2 days ago for bone fragments and a frozen shoulder  but is uncertain of any additional surgical details or restrictions. Encouraged her to contact her surgeon's office for clarification. During evaluation, patient was able to independently transfer to EOB and ambulate 220' without assistance. Her BP remained stable with position changes and post-activity. Returned to bed post session. No acute needs identified but patient was to supposed to have started OP physical therapy for her surgery by now. Encouraged her to call her MD's office regarding restrictions and to initiate PT as prescribed. Further skilled acute physical therapy is not indicated at this time. PLAN :  Recommendation for discharge: (in order for the patient to meet his/her long term goals)  Outpatient physical therapy follow up recommended for right shoulder post surgery (already in place, patient to initiate)    This discharge recommendation:  Has been made in collaboration with the attending provider and/or case management    IF patient discharges home will need the following DME: none       SUBJECTIVE:   Patient stated I had surgery on Tuesday. They took out some bone fragments and cut a ligament.     OBJECTIVE DATA SUMMARY:   HISTORY:    Past Medical History:   Diagnosis Date    Hypertension     Hypertensive retinopathy     Ovarian cyst      Past Surgical History:   Procedure Laterality Date    HX  SECTION      HX HYSTERECTOMY      hystrectomy with unilateral oophorectomy    HX TUBAL LIGATION  1994       Prior level of function: independent  Personal factors and/or comorbidities impacting plan of care:     Home Situation  Home Environment: Private residence  # Steps to Enter: 3  Rails to Enter: Yes  Hand Rails : Bilateral  One/Two Story Residence: Two story  # of Interior Steps: 8  Interior Rails: Left (going up)  Living Alone: No  Support Systems: Parent, Family member(s)  Tub or Shower Type: Tub/Shower combination    EXAMINATION/PRESENTATION/DECISION MAKING:   Critical Behavior:  Neurologic State: Alert  Orientation Level: Oriented X4  Cognition: Appropriate decision making, Appropriate for age attention/concentration, Appropriate safety awareness, Follows commands  Safety/Judgement: Awareness of environment, Fall prevention  Hearing:     Skin:    Edema:   Range Of Motion:  AROM: Generally decreased, functional (RUE limited by recent shoulder sx, LUE WNL)           PROM: Generally decreased, functional (RUE limited by recent shoulder sx, LUE WNL)           Strength:    Strength: Generally decreased, functional (RUE limited by recent shoulder sx, LUE WNL)                    Tone & Sensation:   Tone: Normal              Sensation: Intact               Coordination:  Coordination: Generally decreased, functional (RUE limited by recent shoulder sx, LUE WNL)  Vision:   Tracking: Able to track stimulus in all quadrants w/o difficulty  Diplopia: No  Acuity: Within Defined Limits  Functional Mobility:  Bed Mobility:     Supine to Sit: Independent  Sit to Supine: Independent  Scooting: Independent  Transfers:  Sit to Stand: Independent  Stand to Sit: Independent                       Balance:   Sitting: Intact  Standing: Intact; Without support  Ambulation/Gait Training:  Distance (ft): 220 Feet (ft)  Assistive Device: Gait belt  Ambulation - Level of Assistance: Independent     Gait Description (WDL): Exceptions to WDL  Gait Abnormalities: Altered arm swing Speed/Natalie: Slow          Physical Therapy Evaluation Charge Determination   History Examination Presentation Decision-Making   MEDIUM  Complexity : 1-2 comorbidities / personal factors will impact the outcome/ POC  LOW Complexity : 1-2 Standardized tests and measures addressing body structure, function, activity limitation and / or participation in recreation  LOW Complexity : Stable, uncomplicated  LOW Complexity : FOTO score of       Based on the above components, the patient evaluation is determined to be of the following complexity level: LOW     Pain Ratin    Activity Tolerance:   Good     21 0959 21 1000 21 1007   Vital Signs   /84 126/85 (!) 143/91   MAP (Calculated) 99 99 108   BP Patient Position Supine Sitting Standing      21 1011   Vital Signs   BP (!) 138/92   MAP (Calculated) 107   BP Patient Position Standing; post ambulation       After treatment patient left in no apparent distress:   Supine in bed and Call bell within reach    COMMUNICATION/EDUCATION:   The patients plan of care was discussed with: Registered nurse. Fall prevention education was provided and the patient/caregiver indicated understanding., Patient/family have participated as able in goal setting and plan of care. and Patient/family agree to work toward stated goals and plan of care.     Thank you for this referral.  Talya Louise, PT, DPT   Time Calculation: 24 mins

## 2021-08-16 ENCOUNTER — TELEPHONE (OUTPATIENT)
Dept: CARDIOLOGY CLINIC | Age: 51
End: 2021-08-16

## 2021-08-16 DIAGNOSIS — R55 SYNCOPE, UNSPECIFIED SYNCOPE TYPE: Primary | ICD-10-CM

## 2021-08-16 NOTE — TELEPHONE ENCOUNTER
----- Message from Rose Ortiz. DELVIN Avery sent at 2021  3:07 PM EDT -----  Regardin day event monitor  Can one of you please send a 30 day event monitor to this patient. She is going to be a new pt for sab. He didn't see in hospital but will be seeing in office in 6 weeks. 30 day event monitor is for syncope. I think José Miguel Elliott is on vacation? Future Appointments  2021  10:40 AM   Aure Hammond MD CAVREY BS AMB    Thanks.

## 2021-08-16 NOTE — TELEPHONE ENCOUNTER
Verified patient with two types of identifiers. Updated patient about loop monitor and follow up. Patient verbalized understanding and will call with any other questions.

## 2021-09-20 ENCOUNTER — TELEPHONE (OUTPATIENT)
Dept: CARDIOLOGY CLINIC | Age: 51
End: 2021-09-20

## 2021-09-30 NOTE — TELEPHONE ENCOUNTER
Hospital referred 30 Day Loop monitor is reviewed. This monitor concluded on September 17, 2021 and is normal.  There is no arrhythmia seen. Patient had seizure-like activity and possible syncope and was seen by neurology in hospital with a normal echocardiogram.  The Holter monitor is normal.  Based on these findings there appears to be no obvious cardiac source of her difficulties. She can cancel her cardiology follow-up and see her primary care or alternatively can see the cardiology office nurse practitioner. Angela Greene please cancel her appt with me and move her to [facility] or have her see PCP and send the normal Holter and echo report    08/12/21    ECHO ADULT COMPLETE 08/13/2021 8/13/2021    Interpretation Summary  · LV: Estimated LVEF is 60 - 65%. Normal cavity size, systolic function (ejection fraction normal) and diastolic function. Mild concentric hypertrophy.  Wall motion: normal.    Signed by: Jarrod Ayala MD on 8/13/2021  9:39 AM      Future Appointments   Date Time Provider Isela Toure   10/19/2021 10:20 AM MD EZRA Esteves AMB

## 2021-09-30 NOTE — TELEPHONE ENCOUNTER
Two patient identifiers verified. Per MD patient called and given results. Patient does not wish to follow up with cards and will see PCP. Patient verbalized understanding and denies any further questions or concerns at this time.

## 2022-03-18 PROBLEM — R56.9 SEIZURE (HCC): Status: ACTIVE | Noted: 2021-08-12

## 2022-03-18 PROBLEM — I07.1 TRICUSPID VALVE REGURGITATION: Status: ACTIVE | Noted: 2019-01-17

## 2022-03-19 PROBLEM — I37.1 PULMONIC VALVE REGURGITATION: Status: ACTIVE | Noted: 2019-01-24

## 2022-03-19 PROBLEM — I34.0 MITRAL VALVE REGURGITATION: Status: ACTIVE | Noted: 2019-01-17

## 2022-03-19 PROBLEM — E66.9 OBESITY (BMI 30-39.9): Status: ACTIVE | Noted: 2021-07-09

## 2022-03-19 PROBLEM — H04.123 DRY EYES: Status: ACTIVE | Noted: 2019-01-28

## 2022-03-19 PROBLEM — M24.011 LOOSE BODY OF RIGHT SHOULDER: Status: ACTIVE | Noted: 2021-04-23

## 2022-03-19 PROBLEM — J30.2 SEASONAL ALLERGIES: Status: ACTIVE | Noted: 2019-01-07

## 2022-03-19 PROBLEM — M75.01 ADHESIVE CAPSULITIS OF RIGHT SHOULDER: Status: ACTIVE | Noted: 2021-04-23

## 2022-03-19 PROBLEM — H35.039 HYPERTENSIVE RETINOPATHY: Status: ACTIVE | Noted: 2019-01-28

## 2023-01-13 LAB — MAMMOGRAPHY, EXTERNAL: NORMAL

## 2023-05-22 RX ORDER — CETIRIZINE HYDROCHLORIDE 10 MG/1
TABLET ORAL
COMMUNITY

## 2023-05-22 RX ORDER — AMLODIPINE BESYLATE 10 MG/1
TABLET ORAL
COMMUNITY
Start: 2022-08-09

## 2023-05-22 RX ORDER — POTASSIUM CHLORIDE 20 MEQ/1
1 TABLET, EXTENDED RELEASE ORAL DAILY
COMMUNITY
Start: 2020-09-10

## 2023-05-22 RX ORDER — HYDROCHLOROTHIAZIDE 25 MG/1
25 TABLET ORAL DAILY
COMMUNITY
Start: 2022-08-09

## 2023-05-22 RX ORDER — OXYCODONE HYDROCHLORIDE AND ACETAMINOPHEN 5; 325 MG/1; MG/1
1 TABLET ORAL EVERY 4 HOURS PRN
COMMUNITY
Start: 2021-05-14

## 2023-05-22 RX ORDER — DIPHENHYDRAMINE HCL 25 MG
CAPSULE ORAL PRN
COMMUNITY
Start: 2021-02-12

## 2023-05-22 RX ORDER — FLUTICASONE PROPIONATE 50 MCG
2 SPRAY, SUSPENSION (ML) NASAL DAILY PRN
COMMUNITY

## 2023-08-02 ENCOUNTER — HOSPITAL ENCOUNTER (EMERGENCY)
Facility: HOSPITAL | Age: 53
Discharge: HOME | End: 2023-08-02
Attending: EMERGENCY MEDICINE
Payer: MEDICAID

## 2023-08-02 VITALS
SYSTOLIC BLOOD PRESSURE: 145 MMHG | BODY MASS INDEX: 32.82 KG/M2 | WEIGHT: 197 LBS | HEART RATE: 78 BPM | RESPIRATION RATE: 16 BRPM | HEIGHT: 65 IN | TEMPERATURE: 98.1 F | OXYGEN SATURATION: 99 % | DIASTOLIC BLOOD PRESSURE: 89 MMHG

## 2023-08-02 DIAGNOSIS — I10 HYPERTENSION, UNSPECIFIED TYPE: ICD-10-CM

## 2023-08-02 DIAGNOSIS — Z76.0 ENCOUNTER FOR MEDICATION REFILL: Primary | ICD-10-CM

## 2023-08-02 PROCEDURE — 99283 EMERGENCY DEPT VISIT LOW MDM: CPT

## 2023-08-02 RX ORDER — HYDROCHLOROTHIAZIDE 25 MG/1
25 TABLET ORAL EVERY MORNING
Qty: 30 TABLET | Refills: 0 | Status: SHIPPED | OUTPATIENT
Start: 2023-08-02 | End: 2023-09-01

## 2023-08-02 RX ORDER — AMLODIPINE BESYLATE 10 MG/1
10 TABLET ORAL DAILY
Qty: 30 TABLET | Refills: 0 | Status: SHIPPED | OUTPATIENT
Start: 2023-08-02

## 2023-08-02 ASSESSMENT — PAIN - FUNCTIONAL ASSESSMENT: PAIN_FUNCTIONAL_ASSESSMENT: 0-10

## 2023-08-02 ASSESSMENT — ENCOUNTER SYMPTOMS
NAUSEA: 0
SHORTNESS OF BREATH: 0
COUGH: 0
VOMITING: 0
ABDOMINAL PAIN: 0

## 2023-08-02 ASSESSMENT — LIFESTYLE VARIABLES
HOW MANY STANDARD DRINKS CONTAINING ALCOHOL DO YOU HAVE ON A TYPICAL DAY: 3 OR 4
HOW OFTEN DO YOU HAVE A DRINK CONTAINING ALCOHOL: MONTHLY OR LESS

## 2023-08-02 NOTE — ED PROVIDER NOTES
[facility] EMERGENCY DEPT  EMERGENCY DEPARTMENT ENCOUNTER      Pt Name: Lo Naranjo  MRN: [id number]  [address] 1970  Date of evaluation: 8/2/2023  Provider: Michelet Bae PA-C    CHIEF COMPLAINT       Chief Complaint   Patient presents with    Medication Refill         HISTORY OF PRESENT ILLNESS   (Location/Symptom, Timing/Onset, Context/Setting, Quality, Duration, Modifying Factors, Severity)  Note limiting factors. Lo Naranjo is a 48 y.o. female with history of  has a past medical history of Hypertension, Hypertensive retinopathy, and Ovarian cyst. who presents ambulatory to [facility] ED with cc of medication refill. Patient states that she has a history of hypertension, no other medical history per patient, who presents after losing her PCP. States that her PCP office shutdown, she has been unable to establish care with a new PCP. She is therefore been out of her blood pressure medications for the last 1 to 2 weeks. Reports taking 10 mg amlodipine daily, 25 mg HCTZ daily. No other medications on a daily basis. Denies any side effects from these medications previously, states that she has been on them for over a year prior to stopping after losing her PCP. She has no systemic complaints today, no fever, chills, shortness of breath, chest pain, leg swelling, abdominal pain, N/V/D. No known allergies. PCP: Travis Alcocer MD    There are no other complaints, changes or physical findings at this time. The history is provided by the patient. Review of External Medical Records:     Nursing Notes were reviewed. REVIEW OF SYSTEMS    (2-9 systems for level 4, 10 or more for level 5)     Review of Systems   Constitutional:  Negative for chills and fever. HENT:  Negative for congestion. Respiratory:  Negative for cough and shortness of breath. Cardiovascular:  Negative for chest pain. Gastrointestinal:  Negative for abdominal pain, nausea and vomiting.    Skin:

## 2023-08-02 NOTE — ED NOTES
Pt given discharge instructions, patient education, prescriptions and follow up information. Pt verbalizes understanding. All questions answered. Pt discharged to home in private vehicle, ambulatory. Pt A&Ox4, RA, pain controlled.       Chelle Antoine RN  08/02/23 [id number]

## 2023-08-02 NOTE — ED TRIAGE NOTES
Pt arrives with c/o medication refill. Pt reports needing rx refill of Hydrochlorothiazide 25mg and amlodipine 5 mg. Pt attempted to make appointment but was told she couldn't get into the office until next year.

## 2023-10-18 SDOH — HEALTH STABILITY: PHYSICAL HEALTH: ON AVERAGE, HOW MANY MINUTES DO YOU ENGAGE IN EXERCISE AT THIS LEVEL?: 0 MIN

## 2023-10-18 SDOH — HEALTH STABILITY: PHYSICAL HEALTH: ON AVERAGE, HOW MANY DAYS PER WEEK DO YOU ENGAGE IN MODERATE TO STRENUOUS EXERCISE (LIKE A BRISK WALK)?: 2 DAYS

## 2023-10-19 ENCOUNTER — OFFICE VISIT (OUTPATIENT)
Facility: CLINIC | Age: 53
End: 2023-10-19
Payer: MEDICAID

## 2023-10-19 VITALS
HEART RATE: 82 BPM | OXYGEN SATURATION: 97 % | SYSTOLIC BLOOD PRESSURE: 127 MMHG | TEMPERATURE: 97.5 F | WEIGHT: 197 LBS | DIASTOLIC BLOOD PRESSURE: 80 MMHG | BODY MASS INDEX: 32.82 KG/M2 | RESPIRATION RATE: 16 BRPM | HEIGHT: 65 IN

## 2023-10-19 DIAGNOSIS — Z11.4 ENCOUNTER FOR SCREENING FOR HIV: ICD-10-CM

## 2023-10-19 DIAGNOSIS — Z11.59 NEED FOR HEPATITIS B SCREENING TEST: ICD-10-CM

## 2023-10-19 DIAGNOSIS — Z76.89 ENCOUNTER TO ESTABLISH CARE: Primary | ICD-10-CM

## 2023-10-19 DIAGNOSIS — R01.1 HEART MURMUR: ICD-10-CM

## 2023-10-19 DIAGNOSIS — Z13.220 ENCOUNTER FOR LIPID SCREENING FOR CARDIOVASCULAR DISEASE: ICD-10-CM

## 2023-10-19 DIAGNOSIS — I10 PRIMARY HYPERTENSION: ICD-10-CM

## 2023-10-19 DIAGNOSIS — Z13.21 ENCOUNTER FOR VITAMIN DEFICIENCY SCREENING: ICD-10-CM

## 2023-10-19 DIAGNOSIS — Z13.0 SCREENING FOR IRON DEFICIENCY ANEMIA: ICD-10-CM

## 2023-10-19 DIAGNOSIS — Z12.31 SCREENING MAMMOGRAM FOR BREAST CANCER: ICD-10-CM

## 2023-10-19 DIAGNOSIS — Z13.1 ENCOUNTER FOR SCREENING FOR DIABETES MELLITUS: ICD-10-CM

## 2023-10-19 DIAGNOSIS — Z23 ENCOUNTER FOR IMMUNIZATION: ICD-10-CM

## 2023-10-19 DIAGNOSIS — Z13.6 ENCOUNTER FOR LIPID SCREENING FOR CARDIOVASCULAR DISEASE: ICD-10-CM

## 2023-10-19 DIAGNOSIS — Z12.11 SCREENING FOR MALIGNANT NEOPLASM OF COLON: ICD-10-CM

## 2023-10-19 DIAGNOSIS — R10.33 UMBILICAL PAIN: ICD-10-CM

## 2023-10-19 DIAGNOSIS — Z13.29 SCREENING FOR THYROID DISORDER: ICD-10-CM

## 2023-10-19 PROCEDURE — 3074F SYST BP LT 130 MM HG: CPT

## 2023-10-19 PROCEDURE — 90715 TDAP VACCINE 7 YRS/> IM: CPT

## 2023-10-19 PROCEDURE — 90471 IMMUNIZATION ADMIN: CPT

## 2023-10-19 PROCEDURE — 3079F DIAST BP 80-89 MM HG: CPT

## 2023-10-19 PROCEDURE — 99204 OFFICE O/P NEW MOD 45 MIN: CPT

## 2023-10-19 RX ORDER — AMLODIPINE BESYLATE 10 MG/1
10 TABLET ORAL DAILY
Qty: 90 TABLET | Refills: 0 | Status: SHIPPED | OUTPATIENT
Start: 2023-10-19

## 2023-10-19 RX ORDER — HYDROCHLOROTHIAZIDE 25 MG/1
25 TABLET ORAL EVERY MORNING
Qty: 90 TABLET | Refills: 0 | Status: SHIPPED | OUTPATIENT
Start: 2023-10-19

## 2023-10-19 RX ORDER — ZOSTER VACCINE RECOMBINANT, ADJUVANTED 50 MCG/0.5
0.5 KIT INTRAMUSCULAR SEE ADMIN INSTRUCTIONS
Qty: 0.5 ML | Refills: 0 | Status: SHIPPED | OUTPATIENT
Start: 2023-10-19 | End: 2024-04-16

## 2023-10-19 SDOH — ECONOMIC STABILITY: FOOD INSECURITY: WITHIN THE PAST 12 MONTHS, THE FOOD YOU BOUGHT JUST DIDN'T LAST AND YOU DIDN'T HAVE MONEY TO GET MORE.: NEVER TRUE

## 2023-10-19 SDOH — ECONOMIC STABILITY: HOUSING INSECURITY
IN THE LAST 12 MONTHS, WAS THERE A TIME WHEN YOU DID NOT HAVE A STEADY PLACE TO SLEEP OR SLEPT IN A SHELTER (INCLUDING NOW)?: NO

## 2023-10-19 SDOH — ECONOMIC STABILITY: FOOD INSECURITY: WITHIN THE PAST 12 MONTHS, YOU WORRIED THAT YOUR FOOD WOULD RUN OUT BEFORE YOU GOT MONEY TO BUY MORE.: NEVER TRUE

## 2023-10-19 SDOH — ECONOMIC STABILITY: INCOME INSECURITY: HOW HARD IS IT FOR YOU TO PAY FOR THE VERY BASICS LIKE FOOD, HOUSING, MEDICAL CARE, AND HEATING?: NOT HARD AT ALL

## 2023-10-19 ASSESSMENT — ENCOUNTER SYMPTOMS
VOMITING: 0
ABDOMINAL PAIN: 1
CHEST TIGHTNESS: 0
BACK PAIN: 0
RHINORRHEA: 0
ABDOMINAL DISTENTION: 0
SORE THROAT: 0
WHEEZING: 0
DIARRHEA: 0
SHORTNESS OF BREATH: 0
NAUSEA: 0
ALLERGIC/IMMUNOLOGIC NEGATIVE: 1
PHOTOPHOBIA: 0
COUGH: 0
EYE PAIN: 0

## 2023-10-19 ASSESSMENT — PATIENT HEALTH QUESTIONNAIRE - PHQ9
SUM OF ALL RESPONSES TO PHQ9 QUESTIONS 1 & 2: 0
SUM OF ALL RESPONSES TO PHQ QUESTIONS 1-9: 0
2. FEELING DOWN, DEPRESSED OR HOPELESS: 0
1. LITTLE INTEREST OR PLEASURE IN DOING THINGS: 0
SUM OF ALL RESPONSES TO PHQ QUESTIONS 1-9: 0

## 2023-10-19 NOTE — PROGRESS NOTES
Doretha Marquez  48 y.o. female  1970  [address]  [id number]     [facility]: Progress Note       Encounter Date: 10/19/2023    Patient presents with the following chief complaint(s)    Chief Complaint   Patient presents with    Establish Care    Hypertension        History provided by patient  History of Present Illness   Snehal Kapadia is a 48 y.o. female with past medical history listed, who presents to clinic today as a new patient to me to establish care. As such, pt agrees to obtain baseline lab work for evaluation. Pt has seen Dr. Rashad Cantu in the past.    She  has hx of shoulder scope in Aug 2021. Approximately 2 days after her surgery, patient reportedly had a syncope episode. Patient was seen at the ER subsequently. Per ER visit documentation, seizure was noted by bystanders, however patient reports imaging subsequently did not support evidence of seizure-like activity. Per patient, she feels that her syncope episode was related to her pain medication at the time. She is anticipating surgery soon on her foot for plantar fasciitis. The patient has been diagnosed with hypertension more than 10 years ago. she is currently taking HCTZ 25 & amlodipine 10 for BP control. Diet and Lifestyle:   Home BP Monitoring: patient is occasionally monitoring blood pressure at home   Pt  is  taking medications as instructed, no medication side effects noted, no TIA's, no chest pain on exertion, no dyspnea on exertion, no swelling of ankles. She has reported associated HTN retinopathy in the past.     She has noted tricuspid, pulmonic, mitral valve regurgitation in her chart. Patient says that she has never been told that she had any of these diagnoses or has been so that she has history of a murmur. She has never seen a cardiologist for her symptoms.   She is not complaining of shortness of breath, cough,

## 2023-11-28 ENCOUNTER — TELEPHONE (OUTPATIENT)
Age: 53
End: 2023-11-28

## 2024-01-12 ENCOUNTER — OFFICE VISIT (OUTPATIENT)
Age: 54
End: 2024-01-12
Payer: MEDICAID

## 2024-01-12 VITALS
WEIGHT: 199 LBS | HEIGHT: 65 IN | SYSTOLIC BLOOD PRESSURE: 134 MMHG | BODY MASS INDEX: 33.15 KG/M2 | DIASTOLIC BLOOD PRESSURE: 86 MMHG

## 2024-01-12 DIAGNOSIS — R01.1 HEART MURMUR: ICD-10-CM

## 2024-01-12 DIAGNOSIS — Z87.898 HX OF SYNCOPE: ICD-10-CM

## 2024-01-12 DIAGNOSIS — I10 ESSENTIAL HYPERTENSION: Primary | ICD-10-CM

## 2024-01-12 PROCEDURE — 99203 OFFICE O/P NEW LOW 30 MIN: CPT | Performed by: STUDENT IN AN ORGANIZED HEALTH CARE EDUCATION/TRAINING PROGRAM

## 2024-01-12 PROCEDURE — 3075F SYST BP GE 130 - 139MM HG: CPT | Performed by: STUDENT IN AN ORGANIZED HEALTH CARE EDUCATION/TRAINING PROGRAM

## 2024-01-12 PROCEDURE — 93010 ELECTROCARDIOGRAM REPORT: CPT | Performed by: STUDENT IN AN ORGANIZED HEALTH CARE EDUCATION/TRAINING PROGRAM

## 2024-01-12 PROCEDURE — 93005 ELECTROCARDIOGRAM TRACING: CPT | Performed by: STUDENT IN AN ORGANIZED HEALTH CARE EDUCATION/TRAINING PROGRAM

## 2024-01-12 PROCEDURE — 3079F DIAST BP 80-89 MM HG: CPT | Performed by: STUDENT IN AN ORGANIZED HEALTH CARE EDUCATION/TRAINING PROGRAM

## 2024-01-12 NOTE — PROGRESS NOTES
Doretha Marquez is a 53 y.o. female    had concerns including Hypertension and Other (Tricuspid Valve Regurgitation).    Vitals:    01/12/24 1007   BP: 134/86   Site: Left Upper Arm   Position: Sitting   Weight: 90.3 kg (199 lb)   Height: 1.651 m (5' 5\")        Chest pain NO

## 2024-01-12 NOTE — PROGRESS NOTES
[place]  [address], [address]  [place] [address]    Office [phone],Fax [phone]           Doretha Marquez is a 53 y.o. female presents the office for evaluation of heart murmur.  Consult requested by Raquel Irvin PA-C.      Assessment/Recommendations:     Diagnosis Orders   1. Essential hypertension  EKG 12 Lead      2. Heart murmur        3. Hx of syncope            History of heart murmur . Did not appreciate a heart murmur on examination in the office today.  Echocardiogram performed in the fall 2021 did not show any significant valvular pathology.  At this time, no need to repeat echocardiogram    History of syncope.  Appears to be situational in nature.  Prior examination with echocardiogram and event monitor in 2021 did not show any significant pathology.    Hypertension-stable continue current medical regimen      Primary Care Physician- Raquel Irvin PA-C    Follow-up as needed        Subjective:  With a history of hypertension presents to the office for evaluation of heart murmur.  Patient was previously evaluated by cardiology in the fall 2021 for syncope.  Patient does not recollect all aspects of the syncopal episode.  At that time an echocardiogram showed structurally normal heart.  Event monitor did not show any critical dysrhythmias.  Recently had a evaluation with primary care physician with his concern for heart murmur.  Patient is without any ongoing anginal chest pain symptoms.  She is without any exertional dyspnea nor orthopnea.  No critical family history of coronary artery disease.  No family history of sudden cardiac death.  No family history of valvular heart disease requiring intervention.      Past Medical History:   Diagnosis Date    Adhesive capsulitis of shoulder     Hernia of abdominal wall     Hypertension     Hypertensive retinopathy     Ovarian cyst         Past Surgical History:   Procedure Laterality Date

## 2024-03-06 ENCOUNTER — HOSPITAL ENCOUNTER (EMERGENCY)
Facility: HOSPITAL | Age: 54
Discharge: HOME | End: 2024-03-06
Attending: STUDENT IN AN ORGANIZED HEALTH CARE EDUCATION/TRAINING PROGRAM
Payer: MEDICAID

## 2024-03-06 VITALS
WEIGHT: 197 LBS | DIASTOLIC BLOOD PRESSURE: 85 MMHG | TEMPERATURE: 98.5 F | SYSTOLIC BLOOD PRESSURE: 137 MMHG | HEIGHT: 66 IN | OXYGEN SATURATION: 97 % | BODY MASS INDEX: 31.66 KG/M2 | HEART RATE: 72 BPM | RESPIRATION RATE: 16 BRPM
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
## 2024-03-06 DIAGNOSIS — L03.114 LEFT ARM CELLULITIS: Primary | ICD-10-CM

## 2024-03-06 PROCEDURE — 99283 EMERGENCY DEPT VISIT LOW MDM: CPT

## 2024-03-06 PROCEDURE — 6370000000 HC RX 637 (ALT 250 FOR IP): Performed by: STUDENT IN AN ORGANIZED HEALTH CARE EDUCATION/TRAINING PROGRAM

## 2024-03-06 RX ORDER — CEPHALEXIN 500 MG/1
500 CAPSULE ORAL 3 TIMES DAILY
Qty: 21 CAPSULE | Refills: 0 | Status: SHIPPED | OUTPATIENT
Start: 2024-03-06 | End: 2024-03-13

## 2024-03-06 RX ORDER — CEPHALEXIN 250 MG/1
500 CAPSULE ORAL
Status: COMPLETED | OUTPATIENT
Start: 2024-03-06 | End: 2024-03-06

## 2024-03-06 RX ADMIN — CEPHALEXIN 500 MG: 250 CAPSULE ORAL at 19:34

## 2024-03-07 NOTE — ED TRIAGE NOTES
Patient arrives to ED ambulatory w/o difficulty. No acute distress noted in triage. A&O x 4. Skin is warm, dry & intact on obs. PT c/o bite hazel on left FA. Arm is red, warm and itches. Pt reports 2 previous bites that have resolved.

## 2024-03-07 NOTE — DISCHARGE INSTRUCTIONS
Continue using the steroid cream, take Benadryl as needed for itching.  Take the newly prescribed Keflex for skin infection.  Return the emergency department for fevers, chills, any changing or worsening symptoms.  Otherwise follow-up with your primary doctor within the next week for reevaluation.

## 2024-03-07 NOTE — ED NOTES
Pt given all discharge materials.  Pt verbalized understanding of s/s to return to ED and prescription for Keflex.  Pt has all belongings and ambulated off unit with a steady gait.

## 2024-03-07 NOTE — ED PROVIDER NOTES
CONSULTS:  None    PROCEDURES:  Procedures    FINAL IMPRESSION      1. Left arm cellulitis    2. Bug bite, initial encounter          DISPOSITION/PLAN   DISPOSITION Discharge - Pending Orders Complete 03/06/2024 07:17:28 PM    (Please note that portions of this note were completed with a voice recognition program.  Efforts were made to edit the dictations but occasionally words are mis-transcribed.)    Leandro Ricardo MD (electronically signed)  Emergency Attending Physician      Leandro Ricardo MD  03/06/24 [id number]

## 2024-03-21 ENCOUNTER — HOSPITAL ENCOUNTER (EMERGENCY)
Facility: HOSPITAL | Age: 54
Discharge: HOME | End: 2024-03-21
Attending: EMERGENCY MEDICINE
Payer: MEDICAID

## 2024-03-21 VITALS
BODY MASS INDEX: 31.82 KG/M2 | HEART RATE: 75 BPM | WEIGHT: 198 LBS | SYSTOLIC BLOOD PRESSURE: 142 MMHG | OXYGEN SATURATION: 98 % | TEMPERATURE: 97.8 F | DIASTOLIC BLOOD PRESSURE: 95 MMHG | RESPIRATION RATE: 17 BRPM | HEIGHT: 66 IN

## 2024-03-21 DIAGNOSIS — A49.9 UTI (URINARY TRACT INFECTION), BACTERIAL: ICD-10-CM

## 2024-03-21 DIAGNOSIS — N39.0 UTI (URINARY TRACT INFECTION), BACTERIAL: ICD-10-CM

## 2024-03-21 DIAGNOSIS — R10.84 GENERALIZED ABDOMINAL PAIN: Primary | ICD-10-CM

## 2024-03-21 DIAGNOSIS — R11.0 NAUSEA: ICD-10-CM

## 2024-03-21 LAB
ALBUMIN SERPL-MCNC: 4.3 G/DL (ref 3.5–5.2)
ALBUMIN/GLOB SERPL: 1.2 (ref 1.1–2.2)
ALP SERPL-CCNC: 83 U/L (ref 35–104)
ALT SERPL-CCNC: 11 U/L (ref 10–35)
ANION GAP SERPL CALC-SCNC: 11 MMOL/L (ref 5–15)
APPEARANCE UR: CLEAR
AST SERPL-CCNC: 20 U/L (ref 10–35)
BACTERIA URNS QL MICRO: ABNORMAL /HPF
BASOPHILS # BLD: 0 K/UL (ref 0–1)
BASOPHILS NFR BLD: 0 % (ref 0–1)
BILIRUB SERPL-MCNC: 0.4 MG/DL (ref 0.2–1)
BILIRUB UR QL: NEGATIVE
BUN SERPL-MCNC: 11 MG/DL (ref 6–20)
BUN/CREAT SERPL: 14 (ref 12–20)
CALCIUM SERPL-MCNC: 9.6 MG/DL (ref 8.6–10)
CHLORIDE SERPL-SCNC: 101 MMOL/L (ref 98–107)
CO2 SERPL-SCNC: 26 MMOL/L (ref 22–29)
COLOR UR: ABNORMAL
CREAT SERPL-MCNC: 0.79 MG/DL (ref 0.5–0.9)
DIFFERENTIAL METHOD BLD: ABNORMAL
EOSINOPHIL # BLD: 0.2 K/UL (ref 0–0.4)
EOSINOPHIL NFR BLD: 3 %
EPITH CASTS URNS QL MICRO: ABNORMAL /LPF
ERYTHROCYTE [DISTWIDTH] IN BLOOD BY AUTOMATED COUNT: 13.2 % (ref 11.5–14.5)
GLOBULIN SER CALC-MCNC: 3.6 G/DL (ref 2–4)
GLUCOSE SERPL-MCNC: 119 MG/DL (ref 65–100)
GLUCOSE UR STRIP.AUTO-MCNC: NEGATIVE MG/DL
HCT VFR BLD AUTO: 41.4 % (ref 35–47)
HGB BLD-MCNC: 14.1 G/DL (ref 11.5–16)
HGB UR QL STRIP: NEGATIVE
IMM GRANULOCYTES # BLD AUTO: 0 K/UL (ref 0–0.04)
IMM GRANULOCYTES NFR BLD AUTO: 0 % (ref 0–0.5)
KETONES UR QL STRIP.AUTO: ABNORMAL MG/DL
LEUKOCYTE ESTERASE UR QL STRIP.AUTO: ABNORMAL
LIPASE SERPL-CCNC: 22 U/L (ref 13–60)
LYMPHOCYTES # BLD: 2.3 K/UL (ref 0.8–3.5)
LYMPHOCYTES NFR BLD: 32 % (ref 12–49)
MCH RBC QN AUTO: 32.1 PG (ref 26–34)
MCHC RBC AUTO-ENTMCNC: 34.1 G/DL (ref 30–36.5)
MCV RBC AUTO: 94.3 FL (ref 80–99)
MONOCYTES # BLD: 0.5 K/UL (ref 0–1)
MONOCYTES NFR BLD: 7 % (ref 5–13)
MUCOUS THREADS URNS QL MICRO: ABNORMAL /LPF
NEUTS SEG # BLD: 4.2 K/UL (ref 1.8–8)
NEUTS SEG NFR BLD: 58 % (ref 32–75)
NITRITE UR QL STRIP.AUTO: NEGATIVE
NRBC # BLD: 0 K/UL (ref 0–0.01)
NRBC BLD-RTO: 0 PER 100 WBC
PH UR STRIP: 7 (ref 5–8)
PLATELET # BLD AUTO: 239 K/UL (ref 150–400)
PMV BLD AUTO: 11.1 FL (ref 8.9–12.9)
POTASSIUM SERPL-SCNC: 3.6 MMOL/L (ref 3.5–5.1)
PROT SERPL-MCNC: 7.9 G/DL (ref 6.4–8.3)
PROT UR STRIP-MCNC: NEGATIVE MG/DL
RBC # BLD AUTO: 4.39 M/UL (ref 3.8–5.2)
RBC #/AREA URNS HPF: ABNORMAL /HPF
SODIUM SERPL-SCNC: 138 MMOL/L (ref 136–145)
SP GR UR REFRACTOMETRY: 1.02 (ref 1–1.03)
SPECIMEN HOLD: NORMAL
UROBILINOGEN UR QL STRIP.AUTO: 0.2 EU/DL (ref 0.2–1)
WBC # BLD AUTO: 7.2 K/UL (ref 3.6–11)
WBC URNS QL MICRO: ABNORMAL /HPF (ref 0–4)

## 2024-03-21 PROCEDURE — 6370000000 HC RX 637 (ALT 250 FOR IP): Performed by: EMERGENCY MEDICINE

## 2024-03-21 PROCEDURE — 83690 ASSAY OF LIPASE: CPT

## 2024-03-21 PROCEDURE — 81001 URINALYSIS AUTO W/SCOPE: CPT

## 2024-03-21 PROCEDURE — 80053 COMPREHEN METABOLIC PANEL: CPT

## 2024-03-21 PROCEDURE — 36415 COLL VENOUS BLD VENIPUNCTURE: CPT

## 2024-03-21 PROCEDURE — 85025 COMPLETE CBC W/AUTO DIFF WBC: CPT

## 2024-03-21 PROCEDURE — 99284 EMERGENCY DEPT VISIT MOD MDM: CPT

## 2024-03-21 PROCEDURE — 96374 THER/PROPH/DIAG INJ IV PUSH: CPT

## 2024-03-21 PROCEDURE — 6360000002 HC RX W HCPCS: Performed by: EMERGENCY MEDICINE

## 2024-03-21 RX ORDER — SULFAMETHOXAZOLE AND TRIMETHOPRIM 800; 160 MG/1; MG/1
1 TABLET ORAL 2 TIMES DAILY
Qty: 14 TABLET | Refills: 0 | Status: SHIPPED | OUTPATIENT
Start: 2024-03-21 | End: 2024-03-28

## 2024-03-21 RX ORDER — SULFAMETHOXAZOLE AND TRIMETHOPRIM 800; 160 MG/1; MG/1
1 TABLET ORAL
Status: COMPLETED | OUTPATIENT
Start: 2024-03-21 | End: 2024-03-21

## 2024-03-21 RX ORDER — ONDANSETRON 2 MG/ML
4 INJECTION INTRAMUSCULAR; INTRAVENOUS ONCE
Status: COMPLETED | OUTPATIENT
Start: 2024-03-21 | End: 2024-03-21

## 2024-03-21 RX ORDER — ONDANSETRON 4 MG/1
4 TABLET, ORALLY DISINTEGRATING ORAL 3 TIMES DAILY PRN
Qty: 21 TABLET | Refills: 0 | Status: SHIPPED | OUTPATIENT
Start: 2024-03-21

## 2024-03-21 RX ADMIN — ONDANSETRON 4 MG: 2 INJECTION INTRAMUSCULAR; INTRAVENOUS at 22:10

## 2024-03-21 RX ADMIN — SULFAMETHOXAZOLE AND TRIMETHOPRIM 1 TABLET: 800; 160 TABLET ORAL at 23:13

## 2024-03-21 ASSESSMENT — LIFESTYLE VARIABLES
HOW OFTEN DO YOU HAVE A DRINK CONTAINING ALCOHOL: MONTHLY OR LESS
HOW MANY STANDARD DRINKS CONTAINING ALCOHOL DO YOU HAVE ON A TYPICAL DAY: 1 OR 2

## 2024-03-21 ASSESSMENT — ENCOUNTER SYMPTOMS
VOMITING: 0
FACIAL SWELLING: 0
DIARRHEA: 0
ABDOMINAL PAIN: 1
CHEST TIGHTNESS: 0
COUGH: 0
NAUSEA: 0
SHORTNESS OF BREATH: 0
EYE DISCHARGE: 0
BACK PAIN: 0
SORE THROAT: 0
EYE PAIN: 0

## 2024-03-21 ASSESSMENT — PAIN DESCRIPTION - DESCRIPTORS: DESCRIPTORS: SHARP

## 2024-03-21 ASSESSMENT — PAIN DESCRIPTION - LOCATION: LOCATION: ABDOMEN

## 2024-03-21 ASSESSMENT — PAIN SCALES - GENERAL: PAINLEVEL_OUTOF10: 10

## 2024-03-21 ASSESSMENT — PAIN - FUNCTIONAL ASSESSMENT: PAIN_FUNCTIONAL_ASSESSMENT: 0-10

## 2024-03-22 NOTE — ED NOTES
Pt given all discharge materials.  Pt verbalized understanding of s/s to return to ED and prescriptions x2.  Pt had PIV removed prior to discharge.  Pt has all belongings.  Pt ambulated off unit with a steady gait.

## 2024-03-22 NOTE — ED TRIAGE NOTES
Pt arrives to ED for sharp abd pain and nasuea starting around 1830. PT reports hernia and pain is at site.

## 2024-04-11 ENCOUNTER — OFFICE VISIT (OUTPATIENT)
Facility: CLINIC | Age: 54
End: 2024-04-11
Payer: MEDICAID

## 2024-04-11 VITALS
OXYGEN SATURATION: 98 % | BODY MASS INDEX: 32.14 KG/M2 | TEMPERATURE: 97.5 F | HEIGHT: 66 IN | HEART RATE: 71 BPM | SYSTOLIC BLOOD PRESSURE: 128 MMHG | DIASTOLIC BLOOD PRESSURE: 75 MMHG | WEIGHT: 200 LBS

## 2024-04-11 DIAGNOSIS — E55.9 VITAMIN D DEFICIENCY: ICD-10-CM

## 2024-04-11 DIAGNOSIS — E78.5 DYSLIPIDEMIA: ICD-10-CM

## 2024-04-11 DIAGNOSIS — R73.03 PREDIABETES: ICD-10-CM

## 2024-04-11 DIAGNOSIS — K42.9 UMBILICAL HERNIA WITHOUT OBSTRUCTION AND WITHOUT GANGRENE: ICD-10-CM

## 2024-04-11 DIAGNOSIS — I10 PRIMARY HYPERTENSION: Primary | ICD-10-CM

## 2024-04-11 PROCEDURE — 99214 OFFICE O/P EST MOD 30 MIN: CPT

## 2024-04-11 PROCEDURE — 3074F SYST BP LT 130 MM HG: CPT

## 2024-04-11 PROCEDURE — 3078F DIAST BP <80 MM HG: CPT

## 2024-04-11 RX ORDER — ERGOCALCIFEROL 1.25 MG/1
50000 CAPSULE ORAL WEEKLY
Qty: 12 CAPSULE | Refills: 0 | Status: SHIPPED | OUTPATIENT
Start: 2024-04-11

## 2024-04-11 RX ORDER — AMLODIPINE BESYLATE 10 MG/1
10 TABLET ORAL DAILY
Qty: 90 TABLET | Refills: 0 | Status: SHIPPED | OUTPATIENT
Start: 2024-04-11

## 2024-04-11 RX ORDER — HYDROCHLOROTHIAZIDE 25 MG/1
25 TABLET ORAL EVERY MORNING
Qty: 90 TABLET | Refills: 0 | Status: SHIPPED | OUTPATIENT
Start: 2024-04-11

## 2024-04-11 RX ORDER — BUPROPION HYDROCHLORIDE 300 MG/1
300 TABLET ORAL EVERY MORNING
Qty: 30 TABLET | Refills: 3 | Status: SHIPPED | OUTPATIENT
Start: 2024-04-11

## 2024-04-11 ASSESSMENT — PATIENT HEALTH QUESTIONNAIRE - PHQ9
SUM OF ALL RESPONSES TO PHQ QUESTIONS 1-9: 0
SUM OF ALL RESPONSES TO PHQ9 QUESTIONS 1 & 2: 0
SUM OF ALL RESPONSES TO PHQ QUESTIONS 1-9: 0
2. FEELING DOWN, DEPRESSED OR HOPELESS: NOT AT ALL
1. LITTLE INTEREST OR PLEASURE IN DOING THINGS: NOT AT ALL
SUM OF ALL RESPONSES TO PHQ QUESTIONS 1-9: 0
SUM OF ALL RESPONSES TO PHQ QUESTIONS 1-9: 0

## 2024-04-11 ASSESSMENT — ENCOUNTER SYMPTOMS
ABDOMINAL PAIN: 1
VOMITING: 0
SHORTNESS OF BREATH: 0
COUGH: 0
ABDOMINAL DISTENTION: 0
WHEEZING: 0
PHOTOPHOBIA: 0
CHEST TIGHTNESS: 0
SORE THROAT: 0
RHINORRHEA: 0
ALLERGIC/IMMUNOLOGIC NEGATIVE: 1
NAUSEA: 0
DIARRHEA: 0
EYE PAIN: 0
BACK PAIN: 0

## 2024-04-11 NOTE — PROGRESS NOTES
Doretha Marquez  53 y.o. female  1970  [address] [facility] [address]  [id number]     [place] [facility]: Progress Note       Encounter Date: 4/11/2024    Patient presents with the following chief complaint(s)    Chief Complaint   Patient presents with    3 Month Follow-Up        History provided by patient    Assessment and Plan:   1. Primary hypertension  Comments:  Stable. refill meds. Labs ordered  Orders:  -     amLODIPine (NORVASC) 10 MG tablet; Take 1 tablet by mouth daily, Disp-90 tablet, R-0Normal  -     hydroCHLOROthiazide (HYDRODIURIL) 25 MG tablet; Take 1 tablet by mouth every morning, Disp-90 tablet, R-0Normal  -     CBC; Future  -     Comprehensive Metabolic Panel; Future  2. Prediabetes  Comments:  Labs ordered, discussed importance of diet & increase physical activity  Orders:  -     Hemoglobin A1C; Future  3. Vitamin D deficiency  Comments:  Start vitamin D weekly  Orders:  -     vitamin D (ERGOCALCIFEROL) 1.25 MG (48006 UT) CAPS capsule; Take 1 capsule by mouth once a week, Disp-12 capsule, R-0Normal  -     Vitamin D 25 Hydroxy; Future  4. Umbilical hernia without obstruction and without gangrene  Comments:  Ref placed  Orders:  -     [facility]  5. BMI 32.0-32.9,adult  Comments:  Start Wellbutrin, discussed importance of diet & increase physical activity  Orders:  -     buPROPion (WELLBUTRIN XL) 300 MG extended release tablet; Take 1 tablet by mouth every morning, Disp-30 tablet, R-3Normal  6. Dyslipidemia  Comments:  Labs ordered, discussed importance of diet & increase physical activity  Orders:  -     Lipid Panel; Future       History of Present Illness   Doretha Marquez is a 53 y.o. female with past medical history listed, who presents to clinic today for a follow up visit on chronic conditions.       The patient has been diagnosed with hypertension. she is currently taking HCTZ and

## 2024-04-11 NOTE — PROGRESS NOTES
\"Have you been to the ER, urgent care clinic since your last visit?  Hospitalized since your last visit?\"    YES - When: approximately 1 months ago.  Where and Why: [facility] er, stomach pain .    “Have you seen or consulted any other health care providers outside of [facility] since your last visit?”    NO      Pt is here for a 3 month follow up .   Chief Complaint   Patient presents with    3 Month Follow-Up     /75 (Site: Right Upper Arm, Position: Sitting, Cuff Size: Large Adult)   Pulse 71   Temp 97.5 °F (36.4 °C) (Temporal)   Ht 1.676 m (5' 6\")   Wt 90.7 kg (200 lb)   SpO2 98%   BMI 32.28 kg/m²       Click Here for Release of Records Request

## 2024-05-28 ENCOUNTER — OFFICE VISIT (OUTPATIENT)
Age: 54
End: 2024-05-28

## 2024-05-28 VITALS
BODY MASS INDEX: 31.4 KG/M2 | HEART RATE: 78 BPM | SYSTOLIC BLOOD PRESSURE: 127 MMHG | RESPIRATION RATE: 17 BRPM | TEMPERATURE: 97.8 F | WEIGHT: 195.4 LBS | DIASTOLIC BLOOD PRESSURE: 85 MMHG | HEIGHT: 66 IN | OXYGEN SATURATION: 99 %

## 2024-05-28 DIAGNOSIS — K42.9 UMBILICAL HERNIA WITHOUT OBSTRUCTION AND WITHOUT GANGRENE: Primary | ICD-10-CM

## 2024-05-28 ASSESSMENT — PATIENT HEALTH QUESTIONNAIRE - PHQ9
SUM OF ALL RESPONSES TO PHQ QUESTIONS 1-9: 0
2. FEELING DOWN, DEPRESSED OR HOPELESS: NOT AT ALL
SUM OF ALL RESPONSES TO PHQ QUESTIONS 1-9: 0
SUM OF ALL RESPONSES TO PHQ9 QUESTIONS 1 & 2: 0
SUM OF ALL RESPONSES TO PHQ QUESTIONS 1-9: 0
SUM OF ALL RESPONSES TO PHQ QUESTIONS 1-9: 0
1. LITTLE INTEREST OR PLEASURE IN DOING THINGS: NOT AT ALL

## 2024-05-28 NOTE — PROGRESS NOTES
Identified pt with two pt identifiers (name and ). Reviewed chart in preparation for visit and have obtained necessary documentation.    Doretha Marquez is a 53 y.o. female  Chief Complaint   Patient presents with    New Patient     hernia     /85 (Site: Left Upper Arm, Position: Sitting, Cuff Size: Large Adult)   Pulse 78   Temp 97.8 °F (36.6 °C) (Oral)   Resp 17   Ht 1.676 m (5' 6\")   Wt 88.6 kg (195 lb 6.4 oz)   SpO2 99%   BMI 31.54 kg/m²     1. Have you been to the ER, urgent care clinic since your last visit?  Hospitalized since your last visit?no    2. Have you seen or consulted any other health care providers outside of the [facility] since your last visit?  Include any pap smears or colon screening. no

## 2024-05-28 NOTE — PROGRESS NOTES
[facility] Surgery  Guillermina Saldana MD  [address] [place] [address], [place] [address] [phone]      Patient Name: Doretha Marquez (53 y.o., female)    Patient Address: [address] [place] [id number]    PCP: Raquel Irvin PA-C     Patient contact numbers:  [unfilled] [unfilled]       Chief Complaint   Patient presents with    New Patient     hernia        History of Present Illness    This is a 53-year-old female who presents with abdominal pain located in the umbilical region along with a reducible umbilical hernia in that area.  She describes the pain as intermittent, present 3 times a day, no known alleviating or aggravating factors, denies any history of similar symptoms in the past, has been present for a year.  She describes the pain as burning.  She denies nausea, vomiting, diarrhea, constipation, blood per rectum, melena, chest pain, shortness of breath, fever, chills.    Past Medical History:   Diagnosis Date    Adhesive capsulitis of shoulder     Hernia of abdominal wall     Hypertension     Hypertensive retinopathy     Ovarian cyst        Past Surgical History:   Procedure Laterality Date     SECTION      HYSTERECTOMY (CERVIX STATUS UNKNOWN)      hystrectomy with unilateral oophorectomy    SHOULDER ARTHROSCOPY  2021    TUBAL LIGATION         Family History   Problem Relation Age of Onset    Stroke Father     Diabetes Father        Social History     Tobacco Use    Smoking status: Never    Smokeless tobacco: Never   Vaping Use    Vaping Use: Never used   Substance Use Topics    Alcohol use: Yes     Alcohol/week: 2.0 standard drinks of alcohol     Types: 1 Glasses of wine, 1 Shots of liquor per week     Comment: monthly    Drug use: Never       No Known Allergies    Current Outpatient Medications   Medication Sig Dispense Refill    amLODIPine (NORVASC) 10 MG tablet Take 1 tablet by mouth daily 90

## 2024-05-30 ENCOUNTER — TELEPHONE (OUTPATIENT)
Age: 54
End: 2024-05-30

## 2024-05-30 ASSESSMENT — ENCOUNTER SYMPTOMS
CONSTIPATION: 0
ANAL BLEEDING: 0
ALLERGIC/IMMUNOLOGIC NEGATIVE: 1
RESPIRATORY NEGATIVE: 1
NAUSEA: 0
VOMITING: 0
ABDOMINAL DISTENTION: 0
RECTAL PAIN: 0
ABDOMINAL PAIN: 1
DIARRHEA: 0
BLOOD IN STOOL: 0
EYES NEGATIVE: 1

## 2024-05-30 NOTE — TELEPHONE ENCOUNTER
Attempted to contact patient to schedule surgery with Dr. Saldana. No answer, left voicemail for a return call.

## 2024-06-03 DIAGNOSIS — I10 PRIMARY HYPERTENSION: ICD-10-CM

## 2024-06-04 RX ORDER — HYDROCHLOROTHIAZIDE 25 MG/1
25 TABLET ORAL EVERY MORNING
Qty: 90 TABLET | Refills: 0 | Status: SHIPPED | OUTPATIENT
Start: 2024-06-04

## 2024-07-11 DIAGNOSIS — I10 PRIMARY HYPERTENSION: ICD-10-CM

## 2024-07-11 DIAGNOSIS — E78.5 DYSLIPIDEMIA: ICD-10-CM

## 2024-07-11 DIAGNOSIS — R73.03 PREDIABETES: ICD-10-CM

## 2024-07-31 ENCOUNTER — OFFICE VISIT (OUTPATIENT)
Facility: CLINIC | Age: 54
End: 2024-07-31
Payer: MEDICAID

## 2024-07-31 VITALS
HEART RATE: 58 BPM | DIASTOLIC BLOOD PRESSURE: 78 MMHG | OXYGEN SATURATION: 98 % | WEIGHT: 194 LBS | TEMPERATURE: 98.4 F | HEIGHT: 65 IN | BODY MASS INDEX: 32.32 KG/M2 | SYSTOLIC BLOOD PRESSURE: 132 MMHG | RESPIRATION RATE: 16 BRPM

## 2024-07-31 DIAGNOSIS — I10 PRIMARY HYPERTENSION: ICD-10-CM

## 2024-07-31 DIAGNOSIS — M19.90 ARTHRITIS: Primary | ICD-10-CM

## 2024-07-31 PROCEDURE — 3075F SYST BP GE 130 - 139MM HG: CPT

## 2024-07-31 PROCEDURE — 99214 OFFICE O/P EST MOD 30 MIN: CPT

## 2024-07-31 PROCEDURE — 3078F DIAST BP <80 MM HG: CPT

## 2024-07-31 RX ORDER — BUPROPION HYDROCHLORIDE 300 MG/1
300 TABLET ORAL EVERY MORNING
Qty: 90 TABLET | Refills: 0 | Status: SHIPPED | OUTPATIENT
Start: 2024-07-31

## 2024-07-31 RX ORDER — AMLODIPINE BESYLATE 10 MG/1
10 TABLET ORAL DAILY
Qty: 90 TABLET | Refills: 0 | Status: SHIPPED | OUTPATIENT
Start: 2024-07-31

## 2024-07-31 RX ORDER — IBUPROFEN 600 MG/1
600 TABLET ORAL 3 TIMES DAILY PRN
Qty: 30 TABLET | Refills: 3 | Status: SHIPPED | OUTPATIENT
Start: 2024-07-31

## 2024-07-31 RX ORDER — HYDROCHLOROTHIAZIDE 25 MG/1
25 TABLET ORAL EVERY MORNING
Qty: 90 TABLET | Refills: 0 | Status: SHIPPED | OUTPATIENT
Start: 2024-07-31

## 2024-07-31 ASSESSMENT — ENCOUNTER SYMPTOMS
ABDOMINAL PAIN: 0
CHEST TIGHTNESS: 0
PHOTOPHOBIA: 0
DIARRHEA: 0
SORE THROAT: 0
VOMITING: 0
EYE PAIN: 0
WHEEZING: 0
TROUBLE SWALLOWING: 0
CONSTIPATION: 0
COUGH: 0
BACK PAIN: 0
NAUSEA: 0
BLOOD IN STOOL: 0
SHORTNESS OF BREATH: 0

## 2024-07-31 NOTE — PROGRESS NOTES
Doretha Marquez  54 y.o. female  1970  [address] [facility] [address]  [id number]     [place] [facility]: Progress Note       Encounter Date: 7/31/2024    Patient presents with the following chief complaint(s)    Chief Complaint   Patient presents with    Follow-up    Hypertension    Obesity        History provided by patient    Assessment and Plan:   1. Arthritis  -     ibuprofen (ADVIL;MOTRIN) 600 MG tablet; Take 1 tablet by mouth 3 times daily as needed for Pain, Disp-30 tablet, R-3Normal  2. Primary hypertension  Comments:  Stable. refill meds. Labs ordered  Orders:  -     amLODIPine (NORVASC) 10 MG tablet; Take 1 tablet by mouth daily, Disp-90 tablet, R-0Normal  -     hydroCHLOROthiazide (HYDRODIURIL) 25 MG tablet; Take 1 tablet by mouth every morning, Disp-90 tablet, R-0Normal  3. BMI 32.0-32.9,adult  Comments:  Start Wellbutrin, discussed importance of diet & increase physical activity  Orders:  -     buPROPion (WELLBUTRIN XL) 300 MG extended release tablet; Take 1 tablet by mouth every morning, Disp-90 tablet, R-0Normal       Return in about 3 months (around 10/31/2024) for HTN, Choles..  History of Present Illness   Doretha Marquez is a 54 y.o. female with past medical history listed, who presents to clinic today for a follow up visit on chronic conditions.     Pt says she got labs done this morning.     BMI- Some activity with work. Has been using Buporio for her sppetite. Says it is helping. Says she is doing better with sugar & carb, less fried food.     HTN- controlled on HCTZ and amlodipinefor BP control.  Home BP Monitoring: patient is not monitoring blood pressure at home   Pt is taking medications as instructed, no medication side effects noted, no TIA's, no chest pain on exertion, no dyspnea on exertion, no swelling of ankles.   She wa seern by cardio- no murmur at time, no ECHO completed given no pathology on priori ECHO in 2021    Says

## 2024-07-31 NOTE — PROGRESS NOTES
\"Have you been to the ER, urgent care clinic since your last visit?  Hospitalized since your last visit?\"    NO    “Have you seen or consulted any other health care providers outside of [facility] since your last visit?”    NO      Chief Complaint   Patient presents with    Follow-up    Hypertension    Obesity     /78 (Site: Left Upper Arm, Position: Sitting, Cuff Size: Medium Adult)   Pulse 58   Temp 98.4 °F (36.9 °C) (Oral)   Resp 16   Ht 1.651 m (5' 5\")   Wt 88 kg (194 lb)   SpO2 98%   BMI 32.28 kg/m²         Click Here for Release of Records Request

## 2024-08-01 LAB
25(OH)D3+25(OH)D2 SERPL-MCNC: 21.9 NG/ML (ref 30–100)
ALBUMIN SERPL-MCNC: 4.5 G/DL (ref 3.8–4.9)
ALP SERPL-CCNC: 72 IU/L (ref 44–121)
ALT SERPL-CCNC: 14 IU/L (ref 0–32)
AST SERPL-CCNC: 23 IU/L (ref 0–40)
BILIRUB SERPL-MCNC: 0.7 MG/DL (ref 0–1.2)
BUN SERPL-MCNC: 9 MG/DL (ref 6–24)
BUN/CREAT SERPL: 14 (ref 9–23)
CALCIUM SERPL-MCNC: 9.8 MG/DL (ref 8.7–10.2)
CHLORIDE SERPL-SCNC: 102 MMOL/L (ref 96–106)
CHOLEST SERPL-MCNC: 191 MG/DL (ref 100–199)
CO2 SERPL-SCNC: 23 MMOL/L (ref 20–29)
CREAT SERPL-MCNC: 0.65 MG/DL (ref 0.57–1)
EGFRCR SERPLBLD CKD-EPI 2021: 105 ML/MIN/1.73
ERYTHROCYTE [DISTWIDTH] IN BLOOD BY AUTOMATED COUNT: 13.2 % (ref 11.7–15.4)
GLOBULIN SER CALC-MCNC: 3.1 G/DL (ref 1.5–4.5)
GLUCOSE SERPL-MCNC: 99 MG/DL (ref 70–99)
HBA1C MFR BLD: 5.5 % (ref 4.8–5.6)
HCT VFR BLD AUTO: 41.8 % (ref 34–46.6)
HDLC SERPL-MCNC: 36 MG/DL
HGB BLD-MCNC: 13.8 G/DL (ref 11.1–15.9)
LDLC SERPL CALC-MCNC: 134 MG/DL (ref 0–99)
MCH RBC QN AUTO: 32.2 PG (ref 26.6–33)
MCHC RBC AUTO-ENTMCNC: 33 G/DL (ref 31.5–35.7)
MCV RBC AUTO: 97 FL (ref 79–97)
PLATELET # BLD AUTO: 168 X10E3/UL (ref 150–450)
POTASSIUM SERPL-SCNC: 3.8 MMOL/L (ref 3.5–5.2)
PROT SERPL-MCNC: 7.6 G/DL (ref 6–8.5)
RBC # BLD AUTO: 4.29 X10E6/UL (ref 3.77–5.28)
SODIUM SERPL-SCNC: 140 MMOL/L (ref 134–144)
TRIGL SERPL-MCNC: 113 MG/DL (ref 0–149)
VLDLC SERPL CALC-MCNC: 21 MG/DL (ref 5–40)
WBC # BLD AUTO: 5.5 X10E3/UL (ref 3.4–10.8)

## 2024-08-09 ENCOUNTER — TELEPHONE (OUTPATIENT)
Age: 54
End: 2024-08-09

## 2024-08-30 DIAGNOSIS — F40.243 FEAR OF FLYING: Primary | ICD-10-CM

## 2024-08-30 RX ORDER — ALPRAZOLAM 0.25 MG
TABLET ORAL
Qty: 2 TABLET | Refills: 0 | Status: SHIPPED | OUTPATIENT
Start: 2024-08-30 | End: 2024-09-01

## 2024-10-30 ENCOUNTER — OFFICE VISIT (OUTPATIENT)
Facility: CLINIC | Age: 54
End: 2024-10-30
Payer: MEDICAID

## 2024-10-30 VITALS
HEIGHT: 65 IN | HEART RATE: 67 BPM | BODY MASS INDEX: 30.49 KG/M2 | OXYGEN SATURATION: 99 % | RESPIRATION RATE: 16 BRPM | WEIGHT: 183 LBS | DIASTOLIC BLOOD PRESSURE: 89 MMHG | SYSTOLIC BLOOD PRESSURE: 137 MMHG | TEMPERATURE: 98.8 F

## 2024-10-30 DIAGNOSIS — M25.552 LEFT HIP PAIN: ICD-10-CM

## 2024-10-30 DIAGNOSIS — E78.00 ELEVATED LDL CHOLESTEROL LEVEL: ICD-10-CM

## 2024-10-30 DIAGNOSIS — E55.9 VITAMIN D DEFICIENCY: ICD-10-CM

## 2024-10-30 DIAGNOSIS — I10 PRIMARY HYPERTENSION: Primary | ICD-10-CM

## 2024-10-30 DIAGNOSIS — I10 PRIMARY HYPERTENSION: ICD-10-CM

## 2024-10-30 PROCEDURE — 3079F DIAST BP 80-89 MM HG: CPT

## 2024-10-30 PROCEDURE — 3075F SYST BP GE 130 - 139MM HG: CPT

## 2024-10-30 PROCEDURE — 99214 OFFICE O/P EST MOD 30 MIN: CPT

## 2024-10-30 RX ORDER — ERGOCALCIFEROL 1.25 MG/1
50000 CAPSULE, LIQUID FILLED ORAL WEEKLY
Qty: 12 CAPSULE | Refills: 0 | Status: SHIPPED | OUTPATIENT
Start: 2024-10-30

## 2024-10-30 RX ORDER — BUPROPION HYDROCHLORIDE 300 MG/1
300 TABLET ORAL EVERY MORNING
Qty: 90 TABLET | Refills: 0 | Status: SHIPPED | OUTPATIENT
Start: 2024-10-30

## 2024-10-30 RX ORDER — HYDROCHLOROTHIAZIDE 25 MG/1
25 TABLET ORAL EVERY MORNING
Qty: 90 TABLET | Refills: 0 | Status: SHIPPED | OUTPATIENT
Start: 2024-10-30

## 2024-10-30 RX ORDER — AMLODIPINE BESYLATE 10 MG/1
10 TABLET ORAL DAILY
Qty: 90 TABLET | Refills: 0 | Status: SHIPPED | OUTPATIENT
Start: 2024-10-30

## 2024-10-30 SDOH — ECONOMIC STABILITY: FOOD INSECURITY: WITHIN THE PAST 12 MONTHS, THE FOOD YOU BOUGHT JUST DIDN'T LAST AND YOU DIDN'T HAVE MONEY TO GET MORE.: NEVER TRUE

## 2024-10-30 SDOH — ECONOMIC STABILITY: FOOD INSECURITY: WITHIN THE PAST 12 MONTHS, YOU WORRIED THAT YOUR FOOD WOULD RUN OUT BEFORE YOU GOT MONEY TO BUY MORE.: NEVER TRUE

## 2024-10-30 SDOH — ECONOMIC STABILITY: INCOME INSECURITY: HOW HARD IS IT FOR YOU TO PAY FOR THE VERY BASICS LIKE FOOD, HOUSING, MEDICAL CARE, AND HEATING?: NOT HARD AT ALL

## 2024-10-30 ASSESSMENT — ENCOUNTER SYMPTOMS
WHEEZING: 0
ABDOMINAL PAIN: 0
CONSTIPATION: 0
VOMITING: 0
BACK PAIN: 0
NAUSEA: 0
DIARRHEA: 0
CHEST TIGHTNESS: 0
COUGH: 0
SHORTNESS OF BREATH: 0

## 2024-10-30 NOTE — PROGRESS NOTES
\"Have you been to the ER, urgent care clinic since your last visit?  Hospitalized since your last visit?\"    YES - When: approximately 1 week  ago.  Where and Why: [facility] for pain in abdomen (UTI).    “Have you seen or consulted any other health care providers outside our system since your last visit?”    NO    Chief Complaint   Patient presents with    Follow-up     /89 (Site: Left Upper Arm, Position: Sitting, Cuff Size: Medium Adult)   Pulse 67   Temp 98.8 °F (37.1 °C) (Oral)   Resp 16   Ht 1.651 m (5' 5\")   Wt 83 kg (183 lb)   SpO2 99%   BMI 30.45 kg/m²          
Bupropion for her appetite. Says it is helping. Says she is doing better with sugar & carb, less fried food.      HTN- controlled on HCTZ and amlodipinefor BP control.  Home BP Monitoring: patient is not monitoring blood pressure at home   Pt is taking medications as instructed, no medication side effects noted, no TIA's, no chest pain on exertion, no dyspnea on exertion, no swelling of ankles.   She was seen by cardio- no murmur at time, no ECHO completed given no pathology on priori ECHO in 2021     She was given IBU for pain in her left hip but has not been using it.    Health Maintenance  Health Maintenance Due   Topic Date Due    Hepatitis B vaccine (1 of 3 - 19+ 3-dose series) Never done    Shingles vaccine (1 of 2) Never done    COVID-19 Vaccine (3 - 2023-24 season) 09/01/2024       Vitals:     Vitals:    10/30/24 1346   BP: 137/89   Site: Left Upper Arm   Position: Sitting   Cuff Size: Medium Adult   Pulse: 67   Resp: 16   Temp: 98.8 °F (37.1 °C)   TempSrc: Oral   SpO2: 99%   Weight: 83 kg (183 lb)   Height: 1.651 m (5' 5\")     Body mass index is 30.45 kg/m².    Wt Readings from Last 3 Encounters:   10/30/24 83 kg (183 lb)   07/31/24 88 kg (194 lb)   05/28/24 88.6 kg (195 lb 6.4 oz)       Medications:     Current Outpatient Medications   Medication Sig Dispense Refill    hydroCHLOROthiazide (HYDRODIURIL) 25 MG tablet Take 1 tablet by mouth every morning 90 tablet 0    buPROPion (WELLBUTRIN XL) 300 MG extended release tablet Take 1 tablet by mouth every morning 90 tablet 0    amLODIPine (NORVASC) 10 MG tablet Take 1 tablet by mouth daily 90 tablet 0    vitamin D (ERGOCALCIFEROL) 1.25 MG (83634 UT) CAPS capsule Take 1 capsule by mouth once a week 12 capsule 0    ibuprofen (ADVIL;MOTRIN) 600 MG tablet Take 1 tablet by mouth 3 times daily as needed for Pain 30 tablet 3     No current facility-administered medications for this visit.        Review of Systems   Review of Systems   Constitutional:  Negative for

## 2024-12-11 ENCOUNTER — OFFICE VISIT (OUTPATIENT)
Facility: CLINIC | Age: 54
End: 2024-12-11

## 2024-12-11 VITALS
RESPIRATION RATE: 16 BRPM | TEMPERATURE: 97.8 F | WEIGHT: 186 LBS | OXYGEN SATURATION: 99 % | HEIGHT: 65 IN | SYSTOLIC BLOOD PRESSURE: 136 MMHG | BODY MASS INDEX: 30.99 KG/M2 | HEART RATE: 67 BPM | DIASTOLIC BLOOD PRESSURE: 84 MMHG

## 2024-12-11 DIAGNOSIS — N89.8 VAGINAL DISCHARGE: ICD-10-CM

## 2024-12-11 DIAGNOSIS — N76.0 BV (BACTERIAL VAGINOSIS): ICD-10-CM

## 2024-12-11 DIAGNOSIS — I10 PRIMARY HYPERTENSION: ICD-10-CM

## 2024-12-11 DIAGNOSIS — N89.8 VAGINAL DISCHARGE: Primary | ICD-10-CM

## 2024-12-11 DIAGNOSIS — B96.89 BV (BACTERIAL VAGINOSIS): ICD-10-CM

## 2024-12-11 LAB
BILIRUBIN, URINE, POC: NEGATIVE
BLOOD URINE, POC: NEGATIVE
GLUCOSE URINE, POC: NEGATIVE
KETONES, URINE, POC: NEGATIVE
LEUKOCYTE ESTERASE, URINE, POC: ABNORMAL
NITRITE, URINE, POC: NEGATIVE
PH, URINE, POC: 7 (ref 4.6–8)
PROTEIN,URINE, POC: NEGATIVE
SPECIFIC GRAVITY, URINE, POC: 1.02 (ref 1–1.03)
URINALYSIS CLARITY, POC: CLEAR
URINALYSIS COLOR, POC: YELLOW
UROBILINOGEN, POC: ABNORMAL

## 2024-12-11 RX ORDER — AMLODIPINE BESYLATE 10 MG/1
10 TABLET ORAL DAILY
Qty: 90 TABLET | Refills: 0 | OUTPATIENT
Start: 2024-12-11

## 2024-12-11 ASSESSMENT — ENCOUNTER SYMPTOMS
ABDOMINAL PAIN: 0
VOMITING: 0
DIARRHEA: 0
NAUSEA: 0
RESPIRATORY NEGATIVE: 1

## 2024-12-11 NOTE — PROGRESS NOTES
\"Have you been to the ER, urgent care clinic since your last visit?  Hospitalized since your last visit?\"    NO    “Have you seen or consulted any other health care providers outside our system since your last visit?”    NO    Chief Complaint   Patient presents with    Other     \"I think I have an infection.  I have a yellowish discharge.\"     /84 (Site: Right Upper Arm, Position: Sitting, Cuff Size: Medium Adult)   Pulse 67   Temp 97.8 °F (36.6 °C) (Temporal)   Resp 16   Ht 1.651 m (5' 5\")   Wt 84.4 kg (186 lb)   SpO2 99%   BMI 30.95 kg/m²

## 2024-12-11 NOTE — PROGRESS NOTES
Doretha Marquez  54 y.o. female  1970  [address] [facility] [address]  [id number]     [place] [facility]: Progress Note       Encounter Date: 12/11/2024    Patient presents with the following chief complaint(s)    Chief Complaint   Patient presents with    Other     \"I think I have an infection.  I have a yellowish discharge.\"        History provided by patient    Assessment and Plan:   1. Vaginal discharge  -     AMB POC URINALYSIS DIP STICK AUTO W/O MICRO  -     NuSwab Vaginitis Plus (VG+) with Candida (Six Species); Future  -     metroNIDAZOLE (FLAGYL) 500 MG tablet; Take 1 tablet by mouth 2 times daily for 7 days, Disp-14 tablet, R-0Normal  2. BV (bacterial vaginosis)  -     metroNIDAZOLE (FLAGYL) 500 MG tablet; Take 1 tablet by mouth 2 times daily for 7 days, Disp-14 tablet, R-0Normal    Labs ordered. Will contact patient regarding results and will send in RX if needed     No follow-ups on file.  History of Present Illness   Doretha Marquez is a 54 y.o. female with past medical history listed, who presents to clinic today for an acute problem visit.    Had UTI sxms of groin pain. She was given abx  now better. Now having yellow discharge, reports odor but unable to specify.  No itching, no abdominal pain, no frequency, urgency, hematuria.  Reports unprotected intercourse about 1 month ago.      Health Maintenance  Health Maintenance Due   Topic Date Due    Hepatitis B vaccine (1 of 3 - 19+ 3-dose series) Never done    Shingles vaccine (1 of 2) Never done    COVID-19 Vaccine (3 - 2023-24 season) 09/01/2024       Vitals:     Vitals:    12/11/24 0912   BP: 136/84   Site: Right Upper Arm   Position: Sitting   Cuff Size: Medium Adult   Pulse: 67   Resp: 16   Temp: 97.8 °F (36.6 °C)   TempSrc: Temporal   SpO2: 99%   Weight: 84.4 kg (186 lb)   Height: 1.651 m (5' 5\")     Body mass index is 30.95 kg/m².    Wt Readings from Last 3 Encounters:   12/11/24 84.4 kg

## 2024-12-13 LAB
A VAGINAE DNA VAG QL NAA+PROBE: ABNORMAL SCORE
BVAB2 DNA VAG QL NAA+PROBE: ABNORMAL SCORE
C ALBICANS DNA VAG QL NAA+PROBE: NEGATIVE
C GLABRATA DNA VAG QL NAA+PROBE: NEGATIVE
C KRUSEI DNA VAG QL NAA+PROBE: NEGATIVE
C LUSITANIAE DNA VAG QL NAA+PROBE: NEGATIVE
C TRACH DNA SPEC QL NAA+PROBE: NEGATIVE
CANDIDA DNA VAG QL NAA+PROBE: NEGATIVE
MEGA1 DNA VAG QL NAA+PROBE: ABNORMAL SCORE
N GONORRHOEA DNA VAG QL NAA+PROBE: NEGATIVE
T VAGINALIS DNA VAG QL NAA+PROBE: NEGATIVE

## 2024-12-13 RX ORDER — METRONIDAZOLE 500 MG/1
500 TABLET ORAL 2 TIMES DAILY
Qty: 14 TABLET | Refills: 0 | Status: SHIPPED | OUTPATIENT
Start: 2024-12-13 | End: 2024-12-20

## 2025-01-15 DIAGNOSIS — I10 PRIMARY HYPERTENSION: ICD-10-CM

## 2025-01-25 SDOH — ECONOMIC STABILITY: FOOD INSECURITY: WITHIN THE PAST 12 MONTHS, YOU WORRIED THAT YOUR FOOD WOULD RUN OUT BEFORE YOU GOT MONEY TO BUY MORE.: NEVER TRUE

## 2025-01-25 SDOH — ECONOMIC STABILITY: INCOME INSECURITY: IN THE LAST 12 MONTHS, WAS THERE A TIME WHEN YOU WERE NOT ABLE TO PAY THE MORTGAGE OR RENT ON TIME?: NO

## 2025-01-25 SDOH — ECONOMIC STABILITY: FOOD INSECURITY: WITHIN THE PAST 12 MONTHS, THE FOOD YOU BOUGHT JUST DIDN'T LAST AND YOU DIDN'T HAVE MONEY TO GET MORE.: NEVER TRUE

## 2025-01-25 SDOH — ECONOMIC STABILITY: TRANSPORTATION INSECURITY
IN THE PAST 12 MONTHS, HAS THE LACK OF TRANSPORTATION KEPT YOU FROM MEDICAL APPOINTMENTS OR FROM GETTING MEDICATIONS?: NO

## 2025-01-25 SDOH — ECONOMIC STABILITY: TRANSPORTATION INSECURITY
IN THE PAST 12 MONTHS, HAS LACK OF TRANSPORTATION KEPT YOU FROM MEETINGS, WORK, OR FROM GETTING THINGS NEEDED FOR DAILY LIVING?: NO

## 2025-01-30 RX ORDER — AMLODIPINE BESYLATE 10 MG/1
10 TABLET ORAL DAILY
Qty: 60 TABLET | Refills: 0 | Status: SHIPPED | OUTPATIENT
Start: 2025-01-30

## 2025-02-04 ENCOUNTER — OFFICE VISIT (OUTPATIENT)
Facility: CLINIC | Age: 55
End: 2025-02-04

## 2025-02-04 VITALS
BODY MASS INDEX: 30.82 KG/M2 | RESPIRATION RATE: 16 BRPM | TEMPERATURE: 98.1 F | DIASTOLIC BLOOD PRESSURE: 76 MMHG | HEART RATE: 56 BPM | SYSTOLIC BLOOD PRESSURE: 122 MMHG | OXYGEN SATURATION: 99 % | WEIGHT: 185 LBS | HEIGHT: 65 IN

## 2025-02-04 DIAGNOSIS — I10 PRIMARY HYPERTENSION: ICD-10-CM

## 2025-02-04 DIAGNOSIS — J10.1 INFLUENZA A: Primary | ICD-10-CM

## 2025-02-04 DIAGNOSIS — E55.9 VITAMIN D DEFICIENCY: ICD-10-CM

## 2025-02-04 LAB
INFLUENZA A ANTIGEN, POC: POSITIVE
INFLUENZA B ANTIGEN, POC: NEGATIVE
VALID INTERNAL CONTROL, POC: POSITIVE

## 2025-02-04 RX ORDER — ERGOCALCIFEROL 1.25 MG/1
50000 CAPSULE, LIQUID FILLED ORAL WEEKLY
Qty: 12 CAPSULE | Refills: 0 | Status: SHIPPED | OUTPATIENT
Start: 2025-02-04

## 2025-02-04 RX ORDER — IBUPROFEN 600 MG/1
600 TABLET, FILM COATED ORAL 3 TIMES DAILY PRN
Qty: 30 TABLET | Refills: 0 | Status: SHIPPED | OUTPATIENT
Start: 2025-02-04

## 2025-02-04 RX ORDER — AMLODIPINE BESYLATE 10 MG/1
10 TABLET ORAL DAILY
Qty: 90 TABLET | Refills: 0 | Status: SHIPPED | OUTPATIENT
Start: 2025-02-04

## 2025-02-04 RX ORDER — AMLODIPINE BESYLATE 10 MG/1
10 TABLET ORAL DAILY
Qty: 90 TABLET | Refills: 0 | Status: CANCELLED | OUTPATIENT
Start: 2025-02-04

## 2025-02-04 RX ORDER — OSELTAMIVIR PHOSPHATE 75 MG/1
75 CAPSULE ORAL 2 TIMES DAILY
Qty: 10 CAPSULE | Refills: 0 | Status: SHIPPED | OUTPATIENT
Start: 2025-02-04 | End: 2025-02-09

## 2025-02-04 RX ORDER — HYDROCHLOROTHIAZIDE 25 MG/1
25 TABLET ORAL EVERY MORNING
Qty: 90 TABLET | Refills: 0 | Status: SHIPPED | OUTPATIENT
Start: 2025-02-04

## 2025-02-04 RX ORDER — BUPROPION HYDROCHLORIDE 300 MG/1
300 TABLET ORAL EVERY MORNING
Qty: 90 TABLET | Refills: 0 | Status: SHIPPED | OUTPATIENT
Start: 2025-02-04

## 2025-02-04 ASSESSMENT — ENCOUNTER SYMPTOMS
DIARRHEA: 0
SORE THROAT: 0
ABDOMINAL PAIN: 0
NAUSEA: 0
SHORTNESS OF BREATH: 0
RHINORRHEA: 1
CONSTIPATION: 0
VOMITING: 0
SINUS PRESSURE: 0
COUGH: 0

## 2025-02-04 ASSESSMENT — PATIENT HEALTH QUESTIONNAIRE - PHQ9
SUM OF ALL RESPONSES TO PHQ QUESTIONS 1-9: 0
SUM OF ALL RESPONSES TO PHQ9 QUESTIONS 1 & 2: 0
2. FEELING DOWN, DEPRESSED OR HOPELESS: NOT AT ALL
SUM OF ALL RESPONSES TO PHQ QUESTIONS 1-9: 0
1. LITTLE INTEREST OR PLEASURE IN DOING THINGS: NOT AT ALL
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
## 2025-02-04 NOTE — PROGRESS NOTES
\"Have you been to the ER, urgent care clinic since your last visit?  Hospitalized since your last visit?\"    NO    “Have you seen or consulted any other health care providers outside our system since your last visit?”    NO    Chief Complaint   Patient presents with    Follow-up     /76 (Site: Right Upper Arm, Position: Sitting, Cuff Size: Large Adult)   Pulse 56   Temp 98.1 °F (36.7 °C) (Skin)   Resp 16   Ht 1.651 m (5' 5\")   Wt 83.9 kg (185 lb)   SpO2 99%   BMI 30.79 kg/m²

## 2025-03-03 ENCOUNTER — OFFICE VISIT (OUTPATIENT)
Facility: CLINIC | Age: 55
End: 2025-03-03

## 2025-03-03 VITALS
BODY MASS INDEX: 31.65 KG/M2 | RESPIRATION RATE: 16 BRPM | TEMPERATURE: 98 F | HEIGHT: 65 IN | SYSTOLIC BLOOD PRESSURE: 130 MMHG | DIASTOLIC BLOOD PRESSURE: 84 MMHG | WEIGHT: 190 LBS | HEART RATE: 68 BPM | OXYGEN SATURATION: 99 %

## 2025-03-03 DIAGNOSIS — J04.0 LARYNGITIS: ICD-10-CM

## 2025-03-03 DIAGNOSIS — E87.6 HYPOKALEMIA: ICD-10-CM

## 2025-03-03 DIAGNOSIS — R05.2 SUBACUTE COUGH: ICD-10-CM

## 2025-03-03 DIAGNOSIS — Z87.09 HISTORY OF INFLUENZA: Primary | ICD-10-CM

## 2025-03-03 RX ORDER — POTASSIUM CHLORIDE 750 MG/1
10 TABLET, EXTENDED RELEASE ORAL
COMMUNITY
Start: 2025-02-23

## 2025-03-03 ASSESSMENT — ENCOUNTER SYMPTOMS
VOMITING: 0
COUGH: 1
VOICE CHANGE: 1
SHORTNESS OF BREATH: 0
CHOKING: 0
NAUSEA: 0
SINUS PAIN: 0
SINUS PRESSURE: 0
DIARRHEA: 0
SORE THROAT: 0

## 2025-03-03 NOTE — PROGRESS NOTES
Doretha Marquez  54 y.o. female  1970  [address] [facility] [address]  [id number]     [place] [facility]: Progress Note       Encounter Date: 3/3/2025    Patient presents with the following chief complaint(s)    Chief Complaint   Patient presents with    Other     \"I have been sick since last Tuesday.  Lost my voice.  I went to the ER and they told me I had low potassium. Flu and covid negative.\" C/o cough and hoarse        History provided by patient    Assessment and Plan:   1. History of influenza  2. Laryngitis  Comments:  Discussed importance of warm liquids & hydration  3. Subacute cough  4. Hypokalemia  Comments:  Currently on Klor-Con from ER.  Patient had labs completed will be reviewed once received         No follow-ups on file.  History of Present Illness   Doretha Marquez is a 54 y.o. female with past medical history listed, who presents to clinic today for acute problem  Pt previously diagnosed with flu on 2/4/25.  She was subsequently seen in ER on 2/22/25 as her symptoms were persistent. Negative for flu, COVID, pneumonia.  Labs were previously significant for hypokalemia patient was started on some potassium supplementation.  She was also given a cough syrup for her cough.  She says she has noted some improvement.  She was mostly concerned with her hoarseness and losing her voice but says that this improved since yesterday.  She says that she did not go to work yesterday or today and is needing a work note for this.  She did have her labs completed earlier today.    Health Maintenance  Health Maintenance Due   Topic Date Due    Hepatitis B vaccine (1 of 3 - 19+ 3-dose series) Never done    Shingles vaccine (1 of 2) Never done    Pneumococcal 50+ years Vaccine (1 of 1 - PCV) Never done    COVID-19 Vaccine (3 - 2024-25 season) 09/01/2024       Vitals:     Vitals:    03/03/25 1454   BP: 130/84   Site: Right Upper Arm   Position: Sitting   Cuff

## 2025-03-03 NOTE — PROGRESS NOTES
\"Have you been to the ER, urgent care clinic since your last visit?  Hospitalized since your last visit?\"    YES - When: approximately 1 week ago.  Where and Why: [facility] for \"cold\".    “Have you seen or consulted any other health care providers outside our system since your last visit?”    NO      Chief Complaint   Patient presents with    Other     \"I have been sick since last Tuesday.  Lost my voice.  I went to the ER and they told me I had low potassium. Flu and covid negative.\" C/o cough and hoarse     /84 (Site: Right Upper Arm, Position: Sitting, Cuff Size: Large Adult)   Pulse 68   Temp 98 °F (36.7 °C) (Oral)   Resp 16   Ht 1.651 m (5' 5\")   Wt 86.2 kg (190 lb)   SpO2 99%   BMI 31.62 kg/m²

## 2025-03-04 LAB
25(OH)D3+25(OH)D2 SERPL-MCNC: 22.6 NG/ML (ref 30–100)
ALBUMIN SERPL-MCNC: 4.3 G/DL (ref 3.8–4.9)
ALP SERPL-CCNC: 84 IU/L (ref 44–121)
ALT SERPL-CCNC: 15 IU/L (ref 0–32)
AST SERPL-CCNC: 15 IU/L (ref 0–40)
BILIRUB SERPL-MCNC: 0.3 MG/DL (ref 0–1.2)
BUN SERPL-MCNC: 11 MG/DL (ref 6–24)
BUN/CREAT SERPL: 15 (ref 9–23)
CALCIUM SERPL-MCNC: 9.8 MG/DL (ref 8.7–10.2)
CHLORIDE SERPL-SCNC: 104 MMOL/L (ref 96–106)
CHOLEST SERPL-MCNC: 185 MG/DL (ref 100–199)
CO2 SERPL-SCNC: 23 MMOL/L (ref 20–29)
CREAT SERPL-MCNC: 0.75 MG/DL (ref 0.57–1)
EGFRCR SERPLBLD CKD-EPI 2021: 95 ML/MIN/1.73
ERYTHROCYTE [DISTWIDTH] IN BLOOD BY AUTOMATED COUNT: 12.8 % (ref 11.7–15.4)
GLOBULIN SER CALC-MCNC: 3 G/DL (ref 1.5–4.5)
GLUCOSE SERPL-MCNC: 89 MG/DL (ref 70–99)
HCT VFR BLD AUTO: 44.2 % (ref 34–46.6)
HDLC SERPL-MCNC: 39 MG/DL
HGB BLD-MCNC: 14.1 G/DL (ref 11.1–15.9)
LDLC SERPL CALC-MCNC: 131 MG/DL (ref 0–99)
MCH RBC QN AUTO: 31.2 PG (ref 26.6–33)
MCHC RBC AUTO-ENTMCNC: 31.9 G/DL (ref 31.5–35.7)
MCV RBC AUTO: 98 FL (ref 79–97)
PLATELET # BLD AUTO: 276 X10E3/UL (ref 150–450)
POTASSIUM SERPL-SCNC: 4.3 MMOL/L (ref 3.5–5.2)
PROT SERPL-MCNC: 7.3 G/DL (ref 6–8.5)
RBC # BLD AUTO: 4.52 X10E6/UL (ref 3.77–5.28)
SODIUM SERPL-SCNC: 143 MMOL/L (ref 134–144)
TRIGL SERPL-MCNC: 82 MG/DL (ref 0–149)
VLDLC SERPL CALC-MCNC: 15 MG/DL (ref 5–40)
WBC # BLD AUTO: 7 X10E3/UL (ref 3.4–10.8)

## 2025-04-20 ENCOUNTER — HOSPITAL ENCOUNTER (EMERGENCY)
Facility: HOSPITAL | Age: 55
Discharge: HOME | End: 2025-04-21
Attending: EMERGENCY MEDICINE
Payer: MEDICAID

## 2025-04-20 VITALS
HEIGHT: 65 IN | HEART RATE: 75 BPM | DIASTOLIC BLOOD PRESSURE: 93 MMHG | OXYGEN SATURATION: 98 % | RESPIRATION RATE: 20 BRPM | BODY MASS INDEX: 32.82 KG/M2 | SYSTOLIC BLOOD PRESSURE: 130 MMHG | WEIGHT: 197 LBS | TEMPERATURE: 98.4 F

## 2025-04-20 DIAGNOSIS — T30.4 CHEMICAL BURN: Primary | ICD-10-CM

## 2025-04-20 PROCEDURE — 16020 DRESS/DEBRID P-THICK BURN S: CPT

## 2025-04-20 PROCEDURE — 6370000000 HC RX 637 (ALT 250 FOR IP)

## 2025-04-20 PROCEDURE — 99283 EMERGENCY DEPT VISIT LOW MDM: CPT

## 2025-04-20 RX ORDER — GINSENG 100 MG
CAPSULE ORAL
Status: COMPLETED | OUTPATIENT
Start: 2025-04-20 | End: 2025-04-20

## 2025-04-20 RX ADMIN — BACITRACIN 1 EACH: 500 OINTMENT TOPICAL at 23:49

## 2025-04-20 ASSESSMENT — PAIN - FUNCTIONAL ASSESSMENT: PAIN_FUNCTIONAL_ASSESSMENT: 0-10

## 2025-04-20 ASSESSMENT — PAIN SCALES - GENERAL: PAINLEVEL_OUTOF10: 2
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
## 2025-04-21 NOTE — DISCHARGE INSTRUCTIONS
Please keep wound covered and hydrated for the next week.  Please apply bacitracin to the foot once or twice a day for the next week.  Please watch for signs of infection including fever, redness, warmth, purulent discharge.  Please follow-up with primary care doctor as needed.
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
## 2025-04-21 NOTE — ED PROVIDER NOTES
MG TABLET    Take 1 tablet by mouth every morning    IBUPROFEN (ADVIL;MOTRIN) 600 MG TABLET    Take 1 tablet by mouth 3 times daily as needed for Pain    POTASSIUM CHLORIDE (KLOR-CON) 10 MEQ EXTENDED RELEASE TABLET    1 tablet    VITAMIN D (ERGOCALCIFEROL) 1.25 MG (50284 UT) CAPS CAPSULE    Take 1 capsule by mouth once a week       ALLERGIES     Patient has no known allergies.    FAMILY HISTORY       Family History   Problem Relation Age of Onset    Stroke Father     Diabetes Father     High Blood Pressure Father     High Blood Pressure Mother           SOCIAL HISTORY       Social History     Socioeconomic History    Marital status: Single     Spouse name: None    Number of children: None    Years of education: None    Highest education level: None   Tobacco Use    Smoking status: Never    Smokeless tobacco: Never   Vaping Use    Vaping status: Never Used   Substance and Sexual Activity    Alcohol use: Yes     Alcohol/week: 2.0 standard drinks of alcohol     Comment: monthly    Drug use: Never    Sexual activity: Not Currently     Partners: Male     Social Drivers of Health     Financial Resource Strain: Low Risk  (10/30/2024)    Overall Financial Resource Strain (CARDIA)     Difficulty of Paying Living Expenses: Not hard at all   Food Insecurity: No Food Insecurity (1/25/2025)    Hunger Vital Sign     Worried About Running Out of Food in the Last Year: Never true     Ran Out of Food in the Last Year: Never true   Transportation Needs: No Transportation Needs (1/25/2025)    PRAPARE - Transportation     Lack of Transportation (Medical): No     Lack of Transportation (Non-Medical): No   Physical Activity: Inactive (10/18/2023)    Exercise Vital Sign     Days of Exercise per Week: 2 days     Minutes of Exercise per Session: 0 min   Intimate Partner Violence: Not At Risk (10/18/2023)    Humiliation, Afraid, Rape, and Kick questionnaire     Fear of Current or Ex-Partner: No     Emotionally Abused: No     Physically

## 2025-05-06 ENCOUNTER — OFFICE VISIT (OUTPATIENT)
Facility: CLINIC | Age: 55
End: 2025-05-06
Payer: MEDICAID

## 2025-05-06 VITALS
BODY MASS INDEX: 30.82 KG/M2 | OXYGEN SATURATION: 98 % | DIASTOLIC BLOOD PRESSURE: 74 MMHG | HEART RATE: 80 BPM | HEIGHT: 65 IN | TEMPERATURE: 98.1 F | WEIGHT: 185 LBS | RESPIRATION RATE: 16 BRPM | SYSTOLIC BLOOD PRESSURE: 122 MMHG

## 2025-05-06 DIAGNOSIS — H66.92 ACUTE EAR INFECTION, LEFT: ICD-10-CM

## 2025-05-06 DIAGNOSIS — I10 PRIMARY HYPERTENSION: Primary | ICD-10-CM

## 2025-05-06 DIAGNOSIS — I10 PRIMARY HYPERTENSION: ICD-10-CM

## 2025-05-06 DIAGNOSIS — Z87.828: ICD-10-CM

## 2025-05-06 DIAGNOSIS — E78.00 ELEVATED LDL CHOLESTEROL LEVEL: ICD-10-CM

## 2025-05-06 DIAGNOSIS — Z80.9: ICD-10-CM

## 2025-05-06 DIAGNOSIS — E55.9 VITAMIN D DEFICIENCY: ICD-10-CM

## 2025-05-06 DIAGNOSIS — M19.90 ARTHRITIS: ICD-10-CM

## 2025-05-06 DIAGNOSIS — R09.89 CHEST CONGESTION: ICD-10-CM

## 2025-05-06 PROBLEM — E78.5 DYSLIPIDEMIA: Status: ACTIVE | Noted: 2025-05-06

## 2025-05-06 PROCEDURE — 99214 OFFICE O/P EST MOD 30 MIN: CPT

## 2025-05-06 PROCEDURE — 3074F SYST BP LT 130 MM HG: CPT

## 2025-05-06 PROCEDURE — 3078F DIAST BP <80 MM HG: CPT

## 2025-05-06 RX ORDER — ERGOCALCIFEROL 1.25 MG/1
50000 CAPSULE, LIQUID FILLED ORAL WEEKLY
Qty: 12 CAPSULE | Refills: 0 | Status: SHIPPED | OUTPATIENT
Start: 2025-05-06

## 2025-05-06 RX ORDER — IBUPROFEN 600 MG/1
600 TABLET, FILM COATED ORAL 3 TIMES DAILY PRN
Qty: 30 TABLET | Refills: 2 | Status: SHIPPED | OUTPATIENT
Start: 2025-05-06

## 2025-05-06 RX ORDER — GUAIFENESIN 600 MG/1
600 TABLET, EXTENDED RELEASE ORAL 2 TIMES DAILY
Qty: 30 TABLET | Refills: 0 | Status: SHIPPED | OUTPATIENT
Start: 2025-05-06 | End: 2025-05-21

## 2025-05-06 RX ORDER — CIPROFLOXACIN/HYDROCORTISONE 0.2 %-1 %
3 SUSPENSION, DROPS(FINAL DOSAGE FORM)(ML) OTIC (EAR) 2 TIMES DAILY
Qty: 10 ML | Refills: 0 | Status: SHIPPED | OUTPATIENT
Start: 2025-05-06 | End: 2025-05-09

## 2025-05-06 RX ORDER — AMLODIPINE BESYLATE 10 MG/1
10 TABLET ORAL DAILY
Qty: 90 TABLET | Refills: 0 | Status: SHIPPED | OUTPATIENT
Start: 2025-05-06

## 2025-05-06 RX ORDER — HYDROCHLOROTHIAZIDE 25 MG/1
25 TABLET ORAL EVERY MORNING
Qty: 90 TABLET | Refills: 0 | Status: SHIPPED | OUTPATIENT
Start: 2025-05-06

## 2025-05-06 ASSESSMENT — ENCOUNTER SYMPTOMS
COUGH: 1
NAUSEA: 0
VOMITING: 0

## 2025-05-06 NOTE — PROGRESS NOTES
Have you been to the ER, urgent care clinic since your last visit?  Hospitalized since your last visit?   YES - When: approximately 4/26/25 ago.  Where and Why: [facility] for URI and left ear infection.    Have you seen or consulted any other health care providers outside our system since your last visit?   NO    Chief Complaint   Patient presents with    Follow-up     ER f/u 4/26/25 for URI and left ear infection    Hypertension     /74 (BP Site: Right Upper Arm, Patient Position: Sitting, BP Cuff Size: Medium Adult)   Pulse 80   Temp 98.1 °F (36.7 °C) (Skin)   Resp 16   Ht 1.651 m (5' 5\")   Wt 83.9 kg (185 lb)   SpO2 98%   BMI 30.79 kg/m²
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
## 2025-05-09 DIAGNOSIS — H66.92 ACUTE EAR INFECTION, LEFT: Primary | ICD-10-CM

## 2025-05-09 RX ORDER — OFLOXACIN 3 MG/ML
5 SOLUTION AURICULAR (OTIC) 2 TIMES DAILY
Qty: 5 ML | Refills: 0 | Status: SHIPPED | OUTPATIENT
Start: 2025-05-09 | End: 2025-05-19

## 2025-05-21 NOTE — PROGRESS NOTES
Chief Complaint: dizziness  Source: self    Subjective  Caryn Paez is an 48 y.o. female who presents for intermittent episodes of dizziness      PreSyncope  Patient with intermittent episodes dizziness since 2010. She previously had episodes of syncope thought to be 2/2 anemia as she had significant AUB but had a hysterectomy 2/2020 and has not had syncopal episodes since the surgery. She believes it may be anxiety. She's okay one minute then feel like she's about to pass out. Sometimes feels like her heart rate increases beforehand. Thinks it may be a panic attack or associated with low blood sugar. Has not noticed whether its associated with not eating. Last time it happened she took a couple of deep breaths and it stopped. Denies history of anxiety and depression. Has never been in therapy. She does take her blood pressure at home when she has a HA or feels like her heart is racing - runs 110-120s/80-90. Has never seen cardiology. No family history of sudden death at young age. Father had a stroke in his early 76s but no other family history of MI. Father also had DM. Takes Norvasc, HCTZ and metoprolol for HTN however out of metoprolol x 4 days. She increased her water intake and has not taken metoprolol for ~ 4 days and has not had any episodes of dizziness over the last 2 weeks. Allergies - reviewed:   No Known Allergies      Medications - reviewed:   Current Outpatient Medications   Medication Sig    metoprolol tartrate (LOPRESSOR) 25 mg tablet Take 25 mg by mouth two (2) times a day.  potassium chloride (K-DUR, KLOR-CON) 20 mEq tablet TAKE 1 TABLET BY MOUTH ONCE DAILY    amLODIPine (NORVASC) 10 mg tablet TAKE 1 TABLET BY MOUTH ONCE DAILY    cetirizine (ZyrTEC) 10 mg tablet Zyrtec 10 mg tablet   Take 1 tablet every day by oral route at bedtime for 90 days.     hydroCHLOROthiazide (HYDRODIURIL) 25 mg tablet TAKE 1 TABLET BY MOUTH ONCE DAILY     No current facility-administered medications for [Former] : Former [TextBox_13] : Ms. ABDUL is a 68 year old female with no reported past medical history.  Review eligibility for Low-Dose CT lung cancer screening. Reviewed and confirmed that the patient meets screening eligibility criteria:  67 years old   Smoking Status: Former smoker   Number of pack(s) per day: 1 Number of years smoked: 37 Number of pack years smokin Quit year:   No symptoms of lung cancer, including new cough, change in cough, hemoptysis, and unintentional weight loss.  No personal history of lung cancer. No lung cancer in a first degree relative. No history of lung disease or any history of occupational exposures.  this visit. Past Medical History - reviewed:  Past Medical History:   Diagnosis Date    Hypertension     Hypertensive retinopathy     Ovarian cyst          Past Surgical History - reviewed:   Past Surgical History:   Procedure Laterality Date    HX  SECTION  12    HX HYSTERECTOMY  2020    hystrectomy with unilateral oophorectomy    HX TUBAL LIGATION           Social History - reviewed:  Social History     Socioeconomic History    Marital status: SINGLE     Spouse name: Not on file    Number of children: Not on file    Years of education: Not on file    Highest education level: Not on file   Occupational History    Not on file   Social Needs    Financial resource strain: Not on file    Food insecurity     Worry: Not on file     Inability: Not on file    Transportation needs     Medical: Not on file     Non-medical: Not on file   Tobacco Use    Smoking status: Never Smoker    Smokeless tobacco: Never Used   Substance and Sexual Activity    Alcohol use: Yes     Comment: social    Drug use: Never    Sexual activity: Not on file   Lifestyle    Physical activity     Days per week: Not on file     Minutes per session: Not on file    Stress: Not on file   Relationships    Social connections     Talks on phone: Not on file     Gets together: Not on file     Attends Restoration service: Not on file     Active member of club or organization: Not on file     Attends meetings of clubs or organizations: Not on file     Relationship status: Not on file    Intimate partner violence     Fear of current or ex partner: Not on file     Emotionally abused: Not on file     Physically abused: Not on file     Forced sexual activity: Not on file   Other Topics Concern    Not on file   Social History Narrative    Not on file         Family History - reviewed:  Family History   Problem Relation Age of Onset    Diabetes Father     Stroke Father          Immunizations - reviewed:      There is no [YearQuit] : 2010 immunization history on file for this patient. Review of Systems   Constitutional: Negative for chills, fever and weight loss. Respiratory: Negative for cough and shortness of breath. Cardiovascular: Negative for chest pain and palpitations. Neurological: Positive for dizziness. Negative for focal weakness and headaches. Psychiatric/Behavioral: Negative for depression. The patient is not nervous/anxious and does not have insomnia. Physical Exam  Visit Vitals  /79 (BP 1 Location: Left arm, BP Patient Position: Standing)   Pulse 85   Temp 97.5 °F (36.4 °C) (Temporal)   Resp 16   Ht 5' 6\" (1.676 m)   Wt 199 lb 3.2 oz (90.4 kg)   SpO2 97%   BMI 32.15 kg/m²       Physical Exam  Constitutional:       General: She is not in acute distress. Appearance: Normal appearance. She is obese. She is not ill-appearing, toxic-appearing or diaphoretic. Neck:      Vascular: Normal carotid pulses. No carotid bruit or JVD. Cardiovascular:      Rate and Rhythm: Normal rate and regular rhythm. Chest Wall: PMI is not displaced. No thrill. Pulses: Normal pulses. Carotid pulses are 2+ on the right side and 2+ on the left side. Radial pulses are 2+ on the right side and 2+ on the left side. Heart sounds: Normal heart sounds, S1 normal and S2 normal. Heart sounds not distant. No murmur. No friction rub. No gallop. No S3 or S4 sounds. Abdominal:      Comments: No abdominal bruits   Musculoskeletal:      Right lower leg: No edema. Left lower leg: No edema. Neurological:      Mental Status: She is alert. Orthostatic VS WNL    EKG: Sinus rhythm at 68bpm without ST changes, QTc WNL  Assessment/Plan    ICD-10-CM ICD-9-CM    1. Postural dizziness with presyncope  R42 780.4 AMB POC EKG ROUTINE W/ 12 LEADS, INTER & REP    R55 780.2         Miller Jaeger is an 48 y.o. female with hx of HTN presenting for followup of episodes of dizziness.  She has not had repeat [PacksperDay] : 1 episodes over the last 2 weeks since she increased her hydration. On exam today CV exam was WNL including no carotid nor abdominal bruits and 2+ pulses throughout. Her EKG was WNL And orthostatic VS were WNL. She does not have any cardiac history nor is there hx of early MI, sudden death in her family. Patient has been off of metoprolol for 4 days and BP And HR are appropriate so will D/C metoprolol as suspect that beta blockade as well as dehydration were contributing to these episodes. Additionally, they may represent panic attacks though patient denies anxiety and depression. Should she has further episodes will refer to cardiology for further workup. 1. Postural dizziness with presyncope  - AMB POC EKG ROUTINE W/ 12 LEADS, INTER & REP  - D/C metoprolol, maintain good hydration  - CBC, CMP today  - cards referral if additional symptoms      I have discussed the diagnosis with the patient and the intended plan as seen in the above orders. Patient verbalized understanding of the plan and agrees with the plan. The patient has received an after-visit summary and questions were answered concerning future plans. I have discussed medication side effects and warnings with the patient as well. Informed patient to return to the office if new symptoms arise. Patient discussed with Dr. Woody Negrete MD supervising physician.     Chris Heredia PGY2  Family Medicine Resident [N_Years] : 37 [PacksperYear] : 37

## 2025-06-12 ENCOUNTER — HOSPITAL ENCOUNTER (EMERGENCY)
Facility: HOSPITAL | Age: 55
Discharge: HOME | End: 2025-06-12
Attending: STUDENT IN AN ORGANIZED HEALTH CARE EDUCATION/TRAINING PROGRAM
Payer: MEDICAID

## 2025-06-12 ENCOUNTER — APPOINTMENT (OUTPATIENT)
Facility: HOSPITAL | Age: 55
End: 2025-06-12
Payer: MEDICAID

## 2025-06-12 VITALS
SYSTOLIC BLOOD PRESSURE: 133 MMHG | DIASTOLIC BLOOD PRESSURE: 95 MMHG | HEART RATE: 67 BPM | BODY MASS INDEX: 31.16 KG/M2 | WEIGHT: 187 LBS | RESPIRATION RATE: 18 BRPM | TEMPERATURE: 98 F | HEIGHT: 65 IN | OXYGEN SATURATION: 99 %

## 2025-06-12 DIAGNOSIS — R09.81 NASAL CONGESTION: ICD-10-CM

## 2025-06-12 DIAGNOSIS — J02.9 SORE THROAT: Primary | ICD-10-CM

## 2025-06-12 DIAGNOSIS — H92.02 LEFT EAR PAIN: ICD-10-CM

## 2025-06-12 LAB
ALBUMIN SERPL-MCNC: 4.2 G/DL (ref 3.5–5.2)
ALBUMIN/GLOB SERPL: 1.4 (ref 1.1–2.2)
ALP SERPL-CCNC: 84 U/L (ref 35–104)
ALT SERPL-CCNC: 16 U/L (ref 10–35)
ANION GAP SERPL CALC-SCNC: 12 MMOL/L (ref 2–12)
AST SERPL-CCNC: 21 U/L (ref 10–35)
BASOPHILS # BLD: 0.05 K/UL (ref 0–0.1)
BASOPHILS NFR BLD: 0.7 % (ref 0–1)
BILIRUB SERPL-MCNC: 0.4 MG/DL (ref 0.2–1)
BUN SERPL-MCNC: 10 MG/DL (ref 6–20)
BUN/CREAT SERPL: 14 (ref 12–20)
CALCIUM SERPL-MCNC: 9.2 MG/DL (ref 8.6–10)
CHLORIDE SERPL-SCNC: 106 MMOL/L (ref 98–107)
CO2 SERPL-SCNC: 26 MMOL/L (ref 22–29)
COMMENT:: NORMAL
CREAT SERPL-MCNC: 0.69 MG/DL (ref 0.5–0.9)
CRP SERPL-MCNC: 0.1 MG/DL
DIFFERENTIAL METHOD BLD: NORMAL
EOSINOPHIL # BLD: 0.25 K/UL (ref 0–0.4)
EOSINOPHIL NFR BLD: 3.3 % (ref 0–7)
ERYTHROCYTE [DISTWIDTH] IN BLOOD BY AUTOMATED COUNT: 13.7 % (ref 11.5–14.5)
GLOBULIN SER CALC-MCNC: 3.1 G/DL (ref 2–4)
GLUCOSE SERPL-MCNC: 111 MG/DL (ref 65–100)
HCT VFR BLD AUTO: 37.8 % (ref 35–47)
HGB BLD-MCNC: 13.1 G/DL (ref 11.5–16)
IMM GRANULOCYTES # BLD AUTO: 0.01 K/UL (ref 0–0.04)
IMM GRANULOCYTES NFR BLD AUTO: 0.1 % (ref 0–0.5)
LYMPHOCYTES # BLD: 1.93 K/UL (ref 0.8–3.5)
LYMPHOCYTES NFR BLD: 25.5 % (ref 12–49)
MCH RBC QN AUTO: 32.6 PG (ref 26–34)
MCHC RBC AUTO-ENTMCNC: 34.7 G/DL (ref 30–36.5)
MCV RBC AUTO: 94 FL (ref 80–99)
MONOCYTES # BLD: 0.45 K/UL (ref 0–1)
MONOCYTES NFR BLD: 6 % (ref 5–13)
NEUTS SEG # BLD: 4.87 K/UL (ref 1.8–8)
NEUTS SEG NFR BLD: 64.4 % (ref 32–75)
NRBC # BLD: 0 K/UL (ref 0–0.01)
NRBC BLD-RTO: 0 PER 100 WBC
PLATELET # BLD AUTO: 233 K/UL (ref 150–400)
PMV BLD AUTO: 11.2 FL (ref 8.9–12.9)
POTASSIUM SERPL-SCNC: 3.3 MMOL/L (ref 3.5–5.1)
PROT SERPL-MCNC: 7.3 G/DL (ref 6.4–8.3)
RBC # BLD AUTO: 4.02 M/UL (ref 3.8–5.2)
S PYO DNA THROAT QL NAA+PROBE: NOT DETECTED
SODIUM SERPL-SCNC: 144 MMOL/L (ref 136–145)
SPECIMEN HOLD: NORMAL
WBC # BLD AUTO: 7.6 K/UL (ref 3.6–11)

## 2025-06-12 PROCEDURE — 6360000004 HC RX CONTRAST MEDICATION: Performed by: STUDENT IN AN ORGANIZED HEALTH CARE EDUCATION/TRAINING PROGRAM

## 2025-06-12 PROCEDURE — 6370000000 HC RX 637 (ALT 250 FOR IP)

## 2025-06-12 PROCEDURE — 6360000002 HC RX W HCPCS

## 2025-06-12 PROCEDURE — 70491 CT SOFT TISSUE NECK W/DYE: CPT

## 2025-06-12 PROCEDURE — 87651 STREP A DNA AMP PROBE: CPT

## 2025-06-12 PROCEDURE — 99285 EMERGENCY DEPT VISIT HI MDM: CPT

## 2025-06-12 PROCEDURE — 36415 COLL VENOUS BLD VENIPUNCTURE: CPT

## 2025-06-12 PROCEDURE — 86140 C-REACTIVE PROTEIN: CPT

## 2025-06-12 PROCEDURE — 96374 THER/PROPH/DIAG INJ IV PUSH: CPT

## 2025-06-12 PROCEDURE — 80053 COMPREHEN METABOLIC PANEL: CPT

## 2025-06-12 PROCEDURE — 85025 COMPLETE CBC W/AUTO DIFF WBC: CPT

## 2025-06-12 RX ORDER — KETOROLAC TROMETHAMINE 30 MG/ML
15 INJECTION, SOLUTION INTRAMUSCULAR; INTRAVENOUS
Status: COMPLETED | OUTPATIENT
Start: 2025-06-12 | End: 2025-06-12

## 2025-06-12 RX ORDER — IOPAMIDOL 755 MG/ML
100 INJECTION, SOLUTION INTRAVASCULAR
Status: COMPLETED | OUTPATIENT
Start: 2025-06-12 | End: 2025-06-12

## 2025-06-12 RX ORDER — POTASSIUM CHLORIDE 750 MG/1
20 TABLET, EXTENDED RELEASE ORAL ONCE
Status: COMPLETED | OUTPATIENT
Start: 2025-06-12 | End: 2025-06-12

## 2025-06-12 RX ADMIN — KETOROLAC TROMETHAMINE 15 MG: 30 INJECTION, SOLUTION INTRAMUSCULAR at 19:20

## 2025-06-12 RX ADMIN — IOPAMIDOL 100 ML: 755 INJECTION, SOLUTION INTRAVENOUS at 19:53

## 2025-06-12 RX ADMIN — POTASSIUM CHLORIDE 20 MEQ: 750 TABLET, FILM COATED, EXTENDED RELEASE ORAL at 21:03

## 2025-06-12 ASSESSMENT — PAIN DESCRIPTION - DESCRIPTORS
DESCRIPTORS: ACHING
DESCRIPTORS: ACHING;DISCOMFORT

## 2025-06-12 ASSESSMENT — PAIN DESCRIPTION - LOCATION
LOCATION: MOUTH
LOCATION: THROAT

## 2025-06-12 ASSESSMENT — PAIN DESCRIPTION - PAIN TYPE: TYPE: ACUTE PAIN

## 2025-06-12 ASSESSMENT — PAIN SCALES - GENERAL
PAINLEVEL_OUTOF10: 0
PAINLEVEL_OUTOF10: 3

## 2025-06-12 ASSESSMENT — PAIN - FUNCTIONAL ASSESSMENT: PAIN_FUNCTIONAL_ASSESSMENT: 0-10

## 2025-06-12 ASSESSMENT — PAIN DESCRIPTION - ORIENTATION: ORIENTATION: LEFT

## 2025-06-12 NOTE — ED TRIAGE NOTES
Pt arrives to ED w/ cc of L sided tonsillar abscess x 1 week. Pt reports she recently had a ear infection x 1 month ago and then thinks that's what caused it. Denies fevers,chills, body aches. Denies OTC meds

## 2025-06-12 NOTE — ED PROVIDER NOTES
[place] EMERGENCY DEPARTMENT  EMERGENCY DEPARTMENT ENCOUNTER      Pt Name: Doretha Marquez  MRN: [id number]  Birthdate 1970  Date of evaluation: 2025  Provider: Cassius Grimm PA-C    CHIEF COMPLAINT       Chief Complaint   Patient presents with    Abscess         HISTORY OF PRESENT ILLNESS   (Location/Symptom, Timing/Onset, Context/Setting, Quality, Duration, Modifying Factors, Severity)  Note limiting factors.   54-year-old female with no significant past medical history presents with complaint of left-sided throat and ear pain.  Patient reports last week, she had a sore throat, more prominent on the left side.  Yesterday and today, she noticed some swelling to the back of the throat on the left side.  She denies foul taste in her mouth.  Eating and drinking without difficulty.  No change in voice.  Denies fevers, cough, nasal congestion, chest pain, shortness of breath, abdominal pain, nausea, vomiting, changes in bowel or bladder.  No known sick contacts.  No medication taken prior to arrival.  No other complaints at this time.    The history is provided by the patient.         Review of External Medical Records:     Nursing Notes were reviewed.    REVIEW OF SYSTEMS    (2-9 systems for level 4, 10 or more for level 5)     Review of Systems    Except as noted above the remainder of the review of systems was reviewed and negative.       PAST MEDICAL HISTORY     Past Medical History:   Diagnosis Date    Adhesive capsulitis of shoulder     Hernia of abdominal wall     Hypertension     Hypertensive retinopathy     Ovarian cyst          SURGICAL HISTORY       Past Surgical History:   Procedure Laterality Date     SECTION      HYSTERECTOMY (CERVIX STATUS UNKNOWN)      hystrectomy with unilateral oophorectomy    SHOULDER ARTHROSCOPY  2021    TUBAL LIGATION           CURRENT MEDICATIONS       Previous Medications    AMLODIPINE (NORVASC) 10 MG TABLET    Take 1 tablet by mouth daily  and Kick questionnaire     Fear of Current or Ex-Partner: No     Emotionally Abused: No     Physically Abused: No     Sexually Abused: No   Housing Stability: Low Risk  (1/25/2025)    Housing Stability Vital Sign     Unable to Pay for Housing in the Last Year: No     Number of Times Moved in the Last Year: 0     Homeless in the Last Year: No           PHYSICAL EXAM    (up to 7 for level 4, 8 or more for level 5)     ED Triage Vitals   BP Systolic BP Percentile Diastolic BP Percentile Temp Temp src Pulse Resp SpO2   -- -- -- -- -- -- -- --      Height Weight         -- --             Body mass index is 31.12 kg/m².    Physical Exam  Vitals and nursing note reviewed.   Constitutional:       General: She is not in acute distress.     Appearance: Normal appearance. She is not ill-appearing.   HENT:      Head: Normocephalic and atraumatic.      Right Ear: Tympanic membrane and ear canal normal.      Left Ear: Tympanic membrane and ear canal normal.      Ears:      Comments: Bilateral ears within normal limits.  No TM erythema or bulging.  Ear canals without erythema, swelling, or drainage.  No tenderness, erythema, or swelling over the mastoid processes bilaterally.     Nose: Congestion present.      Mouth/Throat:      Mouth: Mucous membranes are moist.      Pharynx: Oropharynx is clear. No oropharyngeal exudate or posterior oropharyngeal erythema.      Comments: No significant posterior oropharyngeal erythema.  Uvula is midline.  Mild left tonsillar enlargement without visible abscess.  No submandibular swelling.  Patient speaking in complete sentences without difficulty.  Tolerating secretions without trismus or drooling.  Eyes:      Extraocular Movements: Extraocular movements intact.      Conjunctiva/sclera: Conjunctivae normal.      Pupils: Pupils are equal, round, and reactive to light.   Cardiovascular:      Rate and Rhythm: Normal rate and regular rhythm.      Pulses: Normal pulses.      Heart sounds: Normal

## 2025-07-07 ENCOUNTER — OFFICE VISIT (OUTPATIENT)
Age: 55
End: 2025-07-07
Payer: MEDICAID

## 2025-07-07 VITALS
HEART RATE: 74 BPM | RESPIRATION RATE: 16 BRPM | OXYGEN SATURATION: 96 % | WEIGHT: 187 LBS | HEIGHT: 65 IN | DIASTOLIC BLOOD PRESSURE: 82 MMHG | SYSTOLIC BLOOD PRESSURE: 116 MMHG | BODY MASS INDEX: 31.16 KG/M2

## 2025-07-07 DIAGNOSIS — R09.81 NASAL CONGESTION: ICD-10-CM

## 2025-07-07 DIAGNOSIS — H92.02 OTALGIA, LEFT: Primary | ICD-10-CM

## 2025-07-07 DIAGNOSIS — J35.8 TONSILLAR CYST: ICD-10-CM

## 2025-07-07 PROCEDURE — 99203 OFFICE O/P NEW LOW 30 MIN: CPT | Performed by: OTOLARYNGOLOGY

## 2025-07-07 PROCEDURE — 3079F DIAST BP 80-89 MM HG: CPT | Performed by: OTOLARYNGOLOGY

## 2025-07-07 PROCEDURE — 3074F SYST BP LT 130 MM HG: CPT | Performed by: OTOLARYNGOLOGY

## 2025-07-07 RX ORDER — PREDNISONE 10 MG/1
TABLET ORAL
Qty: 24 TABLET | Refills: 0 | Status: SHIPPED | OUTPATIENT
Start: 2025-07-07

## 2025-07-07 NOTE — PROGRESS NOTES
Otolaryngology-Head and Neck Surgery  New Patient Visit     Patient: Doretha Marquez  YOB: 1970  MRN: [id number]  Date of Service: 2025    Chief Complaint:   Chief Complaint   Patient presents with    New Patient     Acute ear infection, left ear         History of Present Illness: Doretha Marquez is a 55 y.o. female who presents today for discussion of a possible left ear infection    Notes doing better overall now    Dx with ear infection last month   Given Rx for both ear drops and amoxicillin    Nasal congestion, mouth breathing - new in the last month   Claritin PRN which helps    Also noted a throat mass prompting ER visit last month       Past Medical History:  Past Medical History:   Diagnosis Date    Adhesive capsulitis of shoulder     Hernia of abdominal wall     Hypertension     Hypertensive retinopathy     Ovarian cyst        Past Surgical History:   Past Surgical History:   Procedure Laterality Date     SECTION      HYSTERECTOMY (CERVIX STATUS UNKNOWN)      hystrectomy with unilateral oophorectomy    SHOULDER ARTHROSCOPY  2021    TUBAL LIGATION         Medications:   Current Outpatient Medications   Medication Instructions    amLODIPine (NORVASC) 10 mg, Oral, DAILY    buPROPion (WELLBUTRIN XL) 300 mg, Oral, EVERY MORNING    hydroCHLOROthiazide (HYDRODIURIL) 25 mg, Oral, EVERY MORNING    ibuprofen (ADVIL;MOTRIN) 600 mg, Oral, 3 TIMES DAILY PRN    vitamin D (ERGOCALCIFEROL) 50,000 Units, Oral, WEEKLY       Allergies:   No Known Allergies    Social History:   Social History     Tobacco Use    Smoking status: Never    Smokeless tobacco: Never   Vaping Use    Vaping status: Never Used   Substance Use Topics    Alcohol use: Yes     Alcohol/week: 2.0 standard drinks of alcohol     Comment: monthly    Drug use: Never        Family History:  Family History   Problem Relation Age of Onset    High Blood Pressure Mother     Stroke Father     Diabetes Father     High Blood